# Patient Record
Sex: MALE | Race: BLACK OR AFRICAN AMERICAN | NOT HISPANIC OR LATINO | Employment: OTHER | URBAN - METROPOLITAN AREA
[De-identification: names, ages, dates, MRNs, and addresses within clinical notes are randomized per-mention and may not be internally consistent; named-entity substitution may affect disease eponyms.]

---

## 2017-07-06 PROBLEM — I10 HTN (HYPERTENSION): Status: ACTIVE | Noted: 2017-07-06

## 2017-07-06 PROBLEM — E78.2 MIXED DYSLIPIDEMIA: Status: ACTIVE | Noted: 2017-07-06

## 2017-07-06 PROBLEM — N39.0 FREQUENT UTI: Status: ACTIVE | Noted: 2017-07-06

## 2017-07-06 PROBLEM — F79 MENTAL RETARDATION: Status: ACTIVE | Noted: 2017-07-06

## 2017-07-06 PROBLEM — F06.30 MOOD DISORDER DUE TO MEDICAL CONDITION: Status: ACTIVE | Noted: 2017-07-06

## 2017-07-11 PROBLEM — R53.1 WEAKNESS: Status: ACTIVE | Noted: 2017-07-11

## 2017-07-11 PROBLEM — R41.82 ALTERED MENTAL STATUS: Status: ACTIVE | Noted: 2017-07-11

## 2017-10-05 PROBLEM — N40.0 BPH (BENIGN PROSTATIC HYPERPLASIA): Status: ACTIVE | Noted: 2017-10-05

## 2017-10-05 PROBLEM — E53.8 VITAMIN B 12 DEFICIENCY: Status: ACTIVE | Noted: 2017-10-05

## 2018-03-12 PROBLEM — Z12.11 SCREEN FOR COLON CANCER: Status: ACTIVE | Noted: 2018-03-12

## 2020-05-23 PROBLEM — R31.9 URINARY TRACT INFECTION WITH HEMATURIA: Status: ACTIVE | Noted: 2020-05-23

## 2020-05-23 PROBLEM — N39.0 URINARY TRACT INFECTION WITH HEMATURIA: Status: ACTIVE | Noted: 2020-05-23

## 2020-05-25 PROBLEM — N39.0 E-COLI UTI: Status: ACTIVE | Noted: 2020-05-25

## 2020-05-25 PROBLEM — G80.9 CEREBRAL PALSY: Status: ACTIVE | Noted: 2020-05-25

## 2020-05-25 PROBLEM — B96.20 E-COLI UTI: Status: ACTIVE | Noted: 2020-05-25

## 2022-01-12 DIAGNOSIS — U07.1 COVID-19 VIRUS DETECTED: ICD-10-CM

## 2022-02-28 ENCOUNTER — LAB VISIT (OUTPATIENT)
Dept: LAB | Facility: HOSPITAL | Age: 57
End: 2022-02-28
Payer: MEDICAID

## 2022-02-28 DIAGNOSIS — D64.9 ANEMIA, UNSPECIFIED TYPE: ICD-10-CM

## 2022-02-28 PROCEDURE — 82274 ASSAY TEST FOR BLOOD FECAL: CPT | Performed by: NURSE PRACTITIONER

## 2022-03-08 LAB — HEMOCCULT STL QL IA: NEGATIVE

## 2023-06-02 ENCOUNTER — LAB VISIT (OUTPATIENT)
Dept: LAB | Facility: HOSPITAL | Age: 58
End: 2023-06-02
Payer: MEDICAID

## 2023-06-02 DIAGNOSIS — Z12.11 SCREEN FOR COLON CANCER: ICD-10-CM

## 2023-06-02 PROCEDURE — 82274 ASSAY TEST FOR BLOOD FECAL: CPT | Performed by: NURSE PRACTITIONER

## 2023-07-06 LAB — HEMOCCULT STL QL IA: NEGATIVE

## 2024-03-03 PROBLEM — I82.402 ACUTE DEEP VEIN THROMBOSIS (DVT) OF LEFT LOWER EXTREMITY: Status: ACTIVE | Noted: 2024-03-03

## 2024-03-03 PROBLEM — Z12.11 SCREEN FOR COLON CANCER: Status: RESOLVED | Noted: 2018-03-12 | Resolved: 2024-03-03

## 2024-03-03 PROBLEM — B96.20 E-COLI UTI: Status: RESOLVED | Noted: 2020-05-25 | Resolved: 2024-03-03

## 2024-03-03 PROBLEM — R41.82 ALTERED MENTAL STATUS: Status: RESOLVED | Noted: 2017-07-11 | Resolved: 2024-03-03

## 2024-03-03 PROBLEM — N39.0 E-COLI UTI: Status: RESOLVED | Noted: 2020-05-25 | Resolved: 2024-03-03

## 2024-03-22 PROBLEM — K92.0 HEMATEMESIS: Status: ACTIVE | Noted: 2024-03-22

## 2024-03-22 PROBLEM — I82.411 ACUTE DEEP VEIN THROMBOSIS (DVT) OF FEMORAL VEIN OF RIGHT LOWER EXTREMITY: Status: ACTIVE | Noted: 2024-03-22

## 2024-03-24 ENCOUNTER — HOSPITAL ENCOUNTER (INPATIENT)
Facility: HOSPITAL | Age: 59
LOS: 5 days | Discharge: HOME | DRG: 389 | End: 2024-03-29
Attending: SURGERY | Admitting: SURGERY
Payer: MEDICAID

## 2024-03-24 DIAGNOSIS — K56.7 ILEUS: ICD-10-CM

## 2024-03-24 DIAGNOSIS — Z91.89 AT RISK FOR PROLONGED QT INTERVAL SYNDROME: ICD-10-CM

## 2024-03-24 DIAGNOSIS — F79 INTELLECTUAL DISABILITY: ICD-10-CM

## 2024-03-24 DIAGNOSIS — G80.9 CEREBRAL PALSY, UNSPECIFIED TYPE: Primary | ICD-10-CM

## 2024-03-24 LAB
ABO + RH BLD: NORMAL
ALBUMIN SERPL BCP-MCNC: 2.5 G/DL (ref 3.5–5.2)
ALP SERPL-CCNC: 155 U/L (ref 55–135)
ALT SERPL W/O P-5'-P-CCNC: 30 U/L (ref 10–44)
ANION GAP SERPL CALC-SCNC: 10 MMOL/L (ref 8–16)
APTT PPP: 26.8 SEC (ref 21–32)
AST SERPL-CCNC: 30 U/L (ref 10–40)
BASOPHILS # BLD AUTO: 0.02 K/UL (ref 0–0.2)
BASOPHILS NFR BLD: 0.1 % (ref 0–1.9)
BILIRUB SERPL-MCNC: 0.5 MG/DL (ref 0.1–1)
BLD GP AB SCN CELLS X3 SERPL QL: NORMAL
BUN SERPL-MCNC: 24 MG/DL (ref 8–23)
CALCIUM SERPL-MCNC: 8.3 MG/DL (ref 8.7–10.5)
CHLORIDE SERPL-SCNC: 109 MMOL/L (ref 95–110)
CO2 SERPL-SCNC: 23 MMOL/L (ref 23–29)
CREAT SERPL-MCNC: 0.8 MG/DL (ref 0.5–1.4)
DIFFERENTIAL METHOD BLD: ABNORMAL
EOSINOPHIL # BLD AUTO: 0 K/UL (ref 0–0.5)
EOSINOPHIL NFR BLD: 0 % (ref 0–8)
ERYTHROCYTE [DISTWIDTH] IN BLOOD BY AUTOMATED COUNT: 14.7 % (ref 11.5–14.5)
EST. GFR  (NO RACE VARIABLE): >60 ML/MIN/1.73 M^2
FIBRINOGEN PPP-MCNC: 411 MG/DL (ref 182–400)
GLUCOSE SERPL-MCNC: 115 MG/DL (ref 70–110)
HCT VFR BLD AUTO: 37.8 % (ref 40–54)
HGB BLD-MCNC: 12.1 G/DL (ref 14–18)
IMM GRANULOCYTES # BLD AUTO: 0.04 K/UL (ref 0–0.04)
IMM GRANULOCYTES NFR BLD AUTO: 0.3 % (ref 0–0.5)
INR PPP: 1.1 (ref 0.8–1.2)
LACTATE SERPL-SCNC: 2.6 MMOL/L (ref 0.5–2.2)
LYMPHOCYTES # BLD AUTO: 1.3 K/UL (ref 1–4.8)
LYMPHOCYTES NFR BLD: 8.5 % (ref 18–48)
MAGNESIUM SERPL-MCNC: 1.8 MG/DL (ref 1.6–2.6)
MCH RBC QN AUTO: 31 PG (ref 27–31)
MCHC RBC AUTO-ENTMCNC: 32 G/DL (ref 32–36)
MCV RBC AUTO: 97 FL (ref 82–98)
MONOCYTES # BLD AUTO: 0.8 K/UL (ref 0.3–1)
MONOCYTES NFR BLD: 4.9 % (ref 4–15)
NEUTROPHILS # BLD AUTO: 13.7 K/UL (ref 1.8–7.7)
NEUTROPHILS NFR BLD: 86.2 % (ref 38–73)
NRBC BLD-RTO: 0 /100 WBC
PHOSPHATE SERPL-MCNC: 3.8 MG/DL (ref 2.7–4.5)
PLATELET # BLD AUTO: 283 K/UL (ref 150–450)
PMV BLD AUTO: 10.7 FL (ref 9.2–12.9)
POCT GLUCOSE: 120 MG/DL (ref 70–110)
POTASSIUM SERPL-SCNC: 3.3 MMOL/L (ref 3.5–5.1)
POTASSIUM SERPL-SCNC: 3.8 MMOL/L (ref 3.5–5.1)
PROT SERPL-MCNC: 6.1 G/DL (ref 6–8.4)
PROTHROMBIN TIME: 11.9 SEC (ref 9–12.5)
RBC # BLD AUTO: 3.9 M/UL (ref 4.6–6.2)
SODIUM SERPL-SCNC: 142 MMOL/L (ref 136–145)
SPECIMEN OUTDATE: NORMAL
WBC # BLD AUTO: 15.85 K/UL (ref 3.9–12.7)

## 2024-03-24 PROCEDURE — 85384 FIBRINOGEN ACTIVITY: CPT | Performed by: SURGERY

## 2024-03-24 PROCEDURE — 36415 COLL VENOUS BLD VENIPUNCTURE: CPT | Performed by: SURGERY

## 2024-03-24 PROCEDURE — 83735 ASSAY OF MAGNESIUM: CPT | Performed by: SURGERY

## 2024-03-24 PROCEDURE — 84132 ASSAY OF SERUM POTASSIUM: CPT | Performed by: SURGERY

## 2024-03-24 PROCEDURE — 94761 N-INVAS EAR/PLS OXIMETRY MLT: CPT

## 2024-03-24 PROCEDURE — 86901 BLOOD TYPING SEROLOGIC RH(D): CPT | Performed by: SURGERY

## 2024-03-24 PROCEDURE — 11000001 HC ACUTE MED/SURG PRIVATE ROOM

## 2024-03-24 PROCEDURE — 80053 COMPREHEN METABOLIC PANEL: CPT | Performed by: SURGERY

## 2024-03-24 PROCEDURE — 63600175 PHARM REV CODE 636 W HCPCS

## 2024-03-24 PROCEDURE — 85610 PROTHROMBIN TIME: CPT | Performed by: SURGERY

## 2024-03-24 PROCEDURE — 99223 1ST HOSP IP/OBS HIGH 75: CPT | Mod: ,,, | Performed by: SURGERY

## 2024-03-24 PROCEDURE — C9113 INJ PANTOPRAZOLE SODIUM, VIA: HCPCS

## 2024-03-24 PROCEDURE — 25000003 PHARM REV CODE 250

## 2024-03-24 PROCEDURE — 84100 ASSAY OF PHOSPHORUS: CPT | Performed by: SURGERY

## 2024-03-24 PROCEDURE — 85730 THROMBOPLASTIN TIME PARTIAL: CPT | Performed by: SURGERY

## 2024-03-24 PROCEDURE — 83605 ASSAY OF LACTIC ACID: CPT | Performed by: SURGERY

## 2024-03-24 PROCEDURE — 85025 COMPLETE CBC W/AUTO DIFF WBC: CPT | Performed by: SURGERY

## 2024-03-24 RX ORDER — CALCIUM GLUCONATE 20 MG/ML
1 INJECTION, SOLUTION INTRAVENOUS
Status: DISCONTINUED | OUTPATIENT
Start: 2024-03-24 | End: 2024-03-25

## 2024-03-24 RX ORDER — POTASSIUM CHLORIDE 7.45 MG/ML
80 INJECTION INTRAVENOUS
Status: DISCONTINUED | OUTPATIENT
Start: 2024-03-24 | End: 2024-03-25

## 2024-03-24 RX ORDER — RISPERIDONE 0.5 MG/1
0.5 TABLET ORAL NIGHTLY
Status: DISCONTINUED | OUTPATIENT
Start: 2024-03-24 | End: 2024-03-29 | Stop reason: HOSPADM

## 2024-03-24 RX ORDER — LAMOTRIGINE 100 MG/1
100 TABLET ORAL 2 TIMES DAILY
Status: DISCONTINUED | OUTPATIENT
Start: 2024-03-24 | End: 2024-03-29 | Stop reason: HOSPADM

## 2024-03-24 RX ORDER — ONDANSETRON HYDROCHLORIDE 2 MG/ML
4 INJECTION, SOLUTION INTRAVENOUS EVERY 8 HOURS PRN
Status: DISCONTINUED | OUTPATIENT
Start: 2024-03-24 | End: 2024-03-29 | Stop reason: HOSPADM

## 2024-03-24 RX ORDER — SODIUM CHLORIDE, SODIUM LACTATE, POTASSIUM CHLORIDE, CALCIUM CHLORIDE 600; 310; 30; 20 MG/100ML; MG/100ML; MG/100ML; MG/100ML
INJECTION, SOLUTION INTRAVENOUS CONTINUOUS
Status: DISCONTINUED | OUTPATIENT
Start: 2024-03-24 | End: 2024-03-25

## 2024-03-24 RX ORDER — SODIUM CHLORIDE 0.9 % (FLUSH) 0.9 %
3 SYRINGE (ML) INJECTION
Status: DISCONTINUED | OUTPATIENT
Start: 2024-03-24 | End: 2024-03-29 | Stop reason: HOSPADM

## 2024-03-24 RX ORDER — ACETAMINOPHEN 325 MG/1
650 TABLET ORAL EVERY 4 HOURS PRN
Status: DISCONTINUED | OUTPATIENT
Start: 2024-03-24 | End: 2024-03-29 | Stop reason: HOSPADM

## 2024-03-24 RX ORDER — ENOXAPARIN SODIUM 100 MG/ML
40 INJECTION SUBCUTANEOUS EVERY 24 HOURS
Status: DISCONTINUED | OUTPATIENT
Start: 2024-03-24 | End: 2024-03-29

## 2024-03-24 RX ORDER — DIVALPROEX SODIUM 125 MG/1
125 CAPSULE, COATED PELLETS ORAL EVERY 8 HOURS
Status: DISCONTINUED | OUTPATIENT
Start: 2024-03-24 | End: 2024-03-25

## 2024-03-24 RX ORDER — CALCIUM GLUCONATE 20 MG/ML
3 INJECTION, SOLUTION INTRAVENOUS
Status: DISCONTINUED | OUTPATIENT
Start: 2024-03-24 | End: 2024-03-25

## 2024-03-24 RX ORDER — PROCHLORPERAZINE EDISYLATE 5 MG/ML
5 INJECTION INTRAMUSCULAR; INTRAVENOUS EVERY 6 HOURS PRN
Status: DISCONTINUED | OUTPATIENT
Start: 2024-03-24 | End: 2024-03-29 | Stop reason: HOSPADM

## 2024-03-24 RX ORDER — PANTOPRAZOLE SODIUM 40 MG/10ML
40 INJECTION, POWDER, LYOPHILIZED, FOR SOLUTION INTRAVENOUS 2 TIMES DAILY
Status: DISCONTINUED | OUTPATIENT
Start: 2024-03-24 | End: 2024-03-29 | Stop reason: HOSPADM

## 2024-03-24 RX ORDER — TRAMADOL HYDROCHLORIDE 50 MG/1
50 TABLET ORAL EVERY 6 HOURS PRN
Status: DISCONTINUED | OUTPATIENT
Start: 2024-03-24 | End: 2024-03-25

## 2024-03-24 RX ORDER — POTASSIUM CHLORIDE 7.45 MG/ML
40 INJECTION INTRAVENOUS
Status: DISCONTINUED | OUTPATIENT
Start: 2024-03-24 | End: 2024-03-25

## 2024-03-24 RX ORDER — MAGNESIUM SULFATE HEPTAHYDRATE 40 MG/ML
2 INJECTION, SOLUTION INTRAVENOUS
Status: DISCONTINUED | OUTPATIENT
Start: 2024-03-24 | End: 2024-03-25

## 2024-03-24 RX ORDER — MAGNESIUM SULFATE HEPTAHYDRATE 40 MG/ML
4 INJECTION, SOLUTION INTRAVENOUS
Status: DISCONTINUED | OUTPATIENT
Start: 2024-03-24 | End: 2024-03-25

## 2024-03-24 RX ORDER — CALCIUM GLUCONATE 20 MG/ML
2 INJECTION, SOLUTION INTRAVENOUS
Status: DISCONTINUED | OUTPATIENT
Start: 2024-03-24 | End: 2024-03-25

## 2024-03-24 RX ORDER — OLANZAPINE 10 MG/1
10 TABLET ORAL NIGHTLY
Status: DISCONTINUED | OUTPATIENT
Start: 2024-03-24 | End: 2024-03-29 | Stop reason: HOSPADM

## 2024-03-24 RX ORDER — POTASSIUM CHLORIDE 7.45 MG/ML
60 INJECTION INTRAVENOUS
Status: DISCONTINUED | OUTPATIENT
Start: 2024-03-24 | End: 2024-03-25

## 2024-03-24 RX ORDER — POLYETHYLENE GLYCOL 3350 17 G/17G
17 POWDER, FOR SOLUTION ORAL 2 TIMES DAILY
Status: DISCONTINUED | OUTPATIENT
Start: 2024-03-24 | End: 2024-03-24

## 2024-03-24 RX ADMIN — SODIUM CHLORIDE, POTASSIUM CHLORIDE, SODIUM LACTATE AND CALCIUM CHLORIDE: 600; 310; 30; 20 INJECTION, SOLUTION INTRAVENOUS at 05:03

## 2024-03-24 RX ADMIN — DIVALPROEX SODIUM 125 MG: 125 CAPSULE, COATED PELLETS ORAL at 05:03

## 2024-03-24 RX ADMIN — POTASSIUM CHLORIDE 10 MEQ: 7.46 INJECTION, SOLUTION INTRAVENOUS at 11:03

## 2024-03-24 RX ADMIN — OLANZAPINE 10 MG: 5 TABLET, FILM COATED ORAL at 08:03

## 2024-03-24 RX ADMIN — POTASSIUM CHLORIDE 10 MEQ: 7.46 INJECTION, SOLUTION INTRAVENOUS at 07:03

## 2024-03-24 RX ADMIN — TRAMADOL HYDROCHLORIDE 50 MG: 50 TABLET, COATED ORAL at 04:03

## 2024-03-24 RX ADMIN — LAMOTRIGINE 100 MG: 100 TABLET ORAL at 08:03

## 2024-03-24 RX ADMIN — LAMOTRIGINE 100 MG: 100 TABLET ORAL at 09:03

## 2024-03-24 RX ADMIN — PANTOPRAZOLE SODIUM 40 MG: 40 INJECTION, POWDER, FOR SOLUTION INTRAVENOUS at 08:03

## 2024-03-24 RX ADMIN — POTASSIUM CHLORIDE 10 MEQ: 7.46 INJECTION, SOLUTION INTRAVENOUS at 02:03

## 2024-03-24 RX ADMIN — POTASSIUM CHLORIDE 10 MEQ: 7.46 INJECTION, SOLUTION INTRAVENOUS at 10:03

## 2024-03-24 RX ADMIN — MAGNESIUM SULFATE HEPTAHYDRATE 2 G: 40 INJECTION, SOLUTION INTRAVENOUS at 07:03

## 2024-03-24 RX ADMIN — DIVALPROEX SODIUM 125 MG: 125 CAPSULE, COATED PELLETS ORAL at 09:03

## 2024-03-24 RX ADMIN — SODIUM CHLORIDE, POTASSIUM CHLORIDE, SODIUM LACTATE AND CALCIUM CHLORIDE 1000 ML: 600; 310; 30; 20 INJECTION, SOLUTION INTRAVENOUS at 04:03

## 2024-03-24 RX ADMIN — SODIUM CHLORIDE, POTASSIUM CHLORIDE, SODIUM LACTATE AND CALCIUM CHLORIDE: 600; 310; 30; 20 INJECTION, SOLUTION INTRAVENOUS at 10:03

## 2024-03-24 RX ADMIN — DIVALPROEX SODIUM 125 MG: 125 CAPSULE, COATED PELLETS ORAL at 02:03

## 2024-03-24 RX ADMIN — Medication 1 ENEMA: at 11:03

## 2024-03-24 RX ADMIN — OLANZAPINE 10 MG: 5 TABLET, FILM COATED ORAL at 04:03

## 2024-03-24 RX ADMIN — PANTOPRAZOLE SODIUM 40 MG: 40 INJECTION, POWDER, FOR SOLUTION INTRAVENOUS at 09:03

## 2024-03-24 RX ADMIN — ENOXAPARIN SODIUM 40 MG: 40 INJECTION SUBCUTANEOUS at 05:03

## 2024-03-24 RX ADMIN — PIPERACILLIN SODIUM AND TAZOBACTAM SODIUM 4.5 G: 4; .5 INJECTION, POWDER, FOR SOLUTION INTRAVENOUS at 09:03

## 2024-03-24 RX ADMIN — POTASSIUM CHLORIDE 10 MEQ: 7.46 INJECTION, SOLUTION INTRAVENOUS at 12:03

## 2024-03-24 RX ADMIN — RISPERIDONE 0.5 MG: 0.25 TABLET, FILM COATED ORAL at 08:03

## 2024-03-24 RX ADMIN — POTASSIUM CHLORIDE 10 MEQ: 7.46 INJECTION, SOLUTION INTRAVENOUS at 08:03

## 2024-03-24 NOTE — H&P
[facility]  Critical Care - Surgery  History & Physical    Patient Name: Edinson Umanzor  MRN: [id number]  Admission Date: 3/24/2024  Code Status: Full Code  Attending Physician: Henrry Saldivar MD   Primary Care Provider: Krishan Massey II, NP   Principal Problem: <principal problem not specified>    Subjective:     HPI:  59M with PMHx of anxiety/depression, bipolar 1, schizophrenia, cerebral palsy, profound mental delay, BPH, HTN, & HLD with recently diagnosed DVT (LLE, 3/24) on Xarelto presented to [place] ED 3/22 c/o spotty bloody sputum as well as reported blood in stool. H&H stable. He was admitted to the hospital medicine service at OSH for further management.    On the morning of 3/23, he was markedly more distended on exam. Notably lack of clear communication with patient makes history and diagnosis difficult. A CT A/P was obtained that demonstrated severe distention of stomach, small bowel, colon, and distal esophagus suggesting profound ileus with possible pneumatosis of stomach and small bowel with concern for early ischemic process.  IV abx and fluid resuscitation were started, and an NGT placed for decompression return of dark foul-smelling drainage.  The patient became increasingly more tachycardic w/ uptrending lactate 1.5->2.2->3.0. He is transferred to [facility] for evaluation by General Surgery and higher level of care.    On admission to the SICU, the patient is awake and alert at his baseline level of function and communication and in stable condition. He is not requiring vasopressors; blood pressure goals are a MAP >65 and SBP < 180. He does not have any central or arterial access. An NGT is in place to LIWS.    Immediate plans include close hemodynamic and physical exam monitoring and continued fluid resuscitation as needed.            Hospital/ICU Course:  No notes on file         Past Medical History:   Diagnosis Date    Anxiety     Bipolar 1 disorder     BPH  (benign prostatic hyperplasia)     Cerebral palsy     Depression     Frequent UTI     HTN (hypertension)     Hypercholesteremia     Mental retardation     Mood disorder due to medical condition     Schizophrenia        Past Surgical History:   Procedure Laterality Date    BOWEL RESECTION      COLONOSCOPY N/A 3/12/2018    Procedure: COLONOSCOPY;  Surgeon: Chip Villanueva MD;  Location: [facility];  Service: Endoscopy;  Laterality: N/A;       Review of patient's allergies indicates:  No Known Allergies    Family History       Problem Relation (Age of Onset)    Breast cancer Sister    Heart disease Sister, Sister    No Known Problems Father, Mother          Tobacco Use    Smoking status: Never    Smokeless tobacco: Never   Substance and Sexual Activity    Alcohol use: No    Drug use: No    Sexual activity: Never      Review of Systems   Reason unable to perform ROS: intellectual delay.     Objective:     Vital Signs (Most Recent):  Temp: 98.4 °F (36.9 °C) (03/24/24 0400)  Pulse: (!) 123 (03/24/24 0400)  Resp: (!) 24 (03/24/24 0400)  BP: (!) 123/90 (03/24/24 0400)  SpO2: 95 % (03/24/24 0400) Vital Signs (24h Range):  Temp:  [96.1 °F (35.6 °C)-98.4 °F (36.9 °C)] 98.4 °F (36.9 °C)  Pulse:  [110-131] 123  Resp:  [17-24] 24  SpO2:  [94 %-98 %] 95 %  BP: (123-141)/(75-92) 123/90     Weight: 63.5 kg (139 lb 15.9 oz)  Body mass index is 22.6 kg/m².    No intake or output data in the 24 hours ending 03/24/24 0448       Physical Exam  Constitutional:       Appearance: Normal appearance.      Comments: Flat affect   Cardiovascular:      Rate and Rhythm: Regular rhythm. Tachycardia present.   Pulmonary:      Effort: Pulmonary effort is normal. No respiratory distress.   Abdominal:      General: There is distension.      Tenderness: There is no abdominal tenderness.      Comments: NGT to LIWS with drain output  Abdomen is distended but soft in all 4 quadrants. No pain verbalized by patient or grimacing to demonstrate  discomfort on palpation. No rebound or rigidity.  Well-healed laparotomy, L flank incisional scars   Genitourinary:     Comments: Adult diaper  Musculoskeletal:      Comments: Contracted upper extremities   Skin:     General: Skin is warm and dry.   Neurological:      Mental Status: He is alert and oriented to person, place, and time. Mental status is at baseline.            Vents:       Lines/Drains/Airways       Drain  Duration                  NG/OG Tube 03/23/24 1710 Right nostril <1 day              Peripheral Intravenous Line  Duration                  Peripheral IV - Single Lumen 03/22/24 1157 22 G Left;Posterior Hand 1 day         Peripheral IV - Single Lumen 03/23/24 2204 20 G Anterior;Left;Proximal Forearm <1 day                    Significant Labs:    CBC/Anemia Profile:  Recent Labs   Lab 03/22/24  1950 03/23/24  0456 03/24/24  0417   WBC 8.50 7.10 15.85*   HGB 11.7* 12.6* 12.1*   HCT 36.3* 39.8* 37.8*    255 283   MCV 96 97 97   RDW 15.4* 15.5* 14.7*        Chemistries:  Recent Labs   Lab 03/22/24  1203 03/23/24  0456    143   K 4.1 4.0    106   CO2 29 28   BUN 24* 25*   CREATININE 0.87 0.83   CALCIUM 9.2 9.6   ALBUMIN 3.6 4.0   PROT 7.4 7.8   BILITOT 0.5 0.7   ALKPHOS 162* 187*   ALT 38 36   AST 43 51       All pertinent labs within the past 24 hours have been reviewed.    Significant Imaging: I have reviewed all pertinent imaging results/findings within the past 24 hours.  Assessment/Plan:     GI  Hematemesis  59M with a PMH of anxiety/depression, bipolar 1, schizophrenia, cerebral palsy, profound mental delay, BPH, HTN, & HLD with recently diagnosed DVT on xarelto presented to OSH with concerns for GIB, abdominal exam prompted CT A/P with findings concerning for early ischemic process. He is transferred to [facility] for higher level of care. However on arrival, his abdominal exam is reassuring and CT A/P may represent Angella's syndrome. We will closely monitor his labs and exam and  continue treatment for GIB at this time.    Neuro/Psych:   - Sedation: none  - Pain: tylenol 650 PRN, tramadol 50 PRN   - Home medications resumed as able via NGT:   divalproex 125 q8h, lamotrigine 100 BID, risperdal 0.5 qhs, olanzapine 10 qhs             Cardiac:   - BP Goal: MAP > 65  - Pressors: none  - Rhythm: NSR, tachycardia  - Resuscitation w/ 1L LR bolus + continuous mIVF, unclear how much received at OSH    Pulmonary:   - On RA  - Goal SpO2 >92%     Renal:  Recent Labs   Lab 03/22/24  1203 03/23/24  0456 03/24/24  0417   BUN 24* 25* 24*   CREATININE 0.87 0.83 0.8     - Trend BUN/Cr     FEN / GI:   - Daily CMP, PRN K/Mag/Phos per protocol   - Replace electrolytes as needed  - Nutrition: NPO   - NGT to LIWS  - GI ppx: BID PPI IV  - Consider GI consult for EGD if concern for UGI bleed remains.  - Bowel Regimen: held     ID:   - Afebrile  Recent Labs   Lab 03/22/24  1950 03/23/24  0456 03/24/24  0417   WBC 8.50 7.10 15.85*     - Abx: Zosyn initiated for c/f bowel ischemia, will continue for now    Heme/Onc:   Recent Labs   Lab 03/22/24  1950 03/23/24  0456 03/24/24  0417   HGB 11.7* 12.6* 12.1*    255 283   APTT  --   --  26.8   INR  --   --  1.1      - Pt on home xarelto for recently dx DVT in the LLE.    - Therapeutic anticoagulation held for now in setting of presumed GIB  - CBC, PTT daily   - DVT ppx: lovenox 40 qd      Endocrine:   - CCM Goal BG <180  - POCT glucose frequency per protocol     PPx:   Feeding: NPO  Analgesia/Sedation: PRNs/none  Thromboembolic Prevention: lovenox ppx dosing, known LE DVT  HOB >30: Yes  Stress Ulcer: BID PPI  Glucose Control: Will monitor as necessary     Lines/Drains/Airway:  NGT     Dispo/Code Status/Palliative:   - SICU  - Full Code         Linda Hammer MD  Critical Care - Surgery  [facility]

## 2024-03-24 NOTE — SUBJECTIVE & OBJECTIVE
Interval History: NAEON. Patient remains non tender on exam. Afebrile, low grade tachycardia. Lactate down trending with fluid resuscitation.     Medications:  Continuous Infusions:   lactated ringers 100 mL/hr at 03/24/24 0800     Scheduled Meds:   divalproex  125 mg Oral Q8H    enoxparin  40 mg Subcutaneous Daily    lamoTRIgine  100 mg Per NG tube BID    OLANZapine  10 mg Per NG tube Nightly    pantoprazole  40 mg Intravenous BID    piperacillin-tazobactam (Zosyn) IV (PEDS and ADULTS) (extended infusion is not appropriate)  4.5 g Intravenous Q8H    risperiDONE  0.5 mg Per NG tube QHS     PRN Meds:acetaminophen, calcium gluconate IVPB, calcium gluconate IVPB, calcium gluconate IVPB, magnesium sulfate IVPB, magnesium sulfate IVPB, ondansetron, potassium chloride **AND** potassium chloride **AND** potassium chloride, prochlorperazine, sodium chloride 0.9%, sodium phosphate 15 mmol in dextrose 5 % (D5W) 250 mL IVPB, sodium phosphate 20.01 mmol in dextrose 5 % (D5W) 250 mL IVPB, sodium phosphate 30 mmol in dextrose 5 % (D5W) 250 mL IVPB, traMADoL     Review of patient's allergies indicates:  No Known Allergies  Objective:     Vital Signs (Most Recent):  Temp: 98.3 °F (36.8 °C) (03/24/24 0700)  Pulse: (!) 120 (03/24/24 0800)  Resp: (!) 21 (03/24/24 0800)  BP: (!) 165/79 (03/24/24 0800)  SpO2: 95 % (03/24/24 0800) Vital Signs (24h Range):  Temp:  [97 °F (36.1 °C)-98.4 °F (36.9 °C)] 98.3 °F (36.8 °C)  Pulse:  [110-131] 120  Resp:  [12-25] 21  SpO2:  [94 %-100 %] 95 %  BP: (123-193)/(70-98) 165/79     Weight: 63.5 kg (139 lb 15.9 oz)  Body mass index is 22.6 kg/m².    Intake/Output - Last 3 Shifts         03/22 0700 03/23 0659 03/23 0700 03/24 0659 03/24 0700 03/25 0659    I.V. (mL/kg)   246.5 (3.9)    IV Piggyback   1021    Total Intake(mL/kg)   1267.5 (20)    Urine (mL/kg/hr)   200 (2.3)    Other   0    Total Output   200    Net   +1067.5           Urine Occurrence   1 x             Physical Exam  Vitals reviewed.    Constitutional:       Appearance: Normal appearance.   HENT:      Nose:      Comments: NGT in place  Cardiovascular:      Rate and Rhythm: Normal rate and regular rhythm.   Pulmonary:      Effort: Pulmonary effort is normal. No respiratory distress.   Abdominal:      General: There is distension.      Palpations: Abdomen is soft.      Tenderness: There is no abdominal tenderness.   Skin:     General: Skin is warm.   Neurological:      General: No focal deficit present.      Mental Status: He is alert and oriented to person, place, and time.          Significant Labs:  I have reviewed all pertinent lab results within the past 24 hours.  CBC:   Recent Labs   Lab 03/24/24 0417   WBC 15.85*   RBC 3.90*   HGB 12.1*   HCT 37.8*      MCV 97   MCH 31.0   MCHC 32.0     CMP:   Recent Labs   Lab 03/24/24 0417   *   CALCIUM 8.3*   ALBUMIN 2.5*   PROT 6.1      K 3.3*   CO2 23      BUN 24*   CREATININE 0.8   ALKPHOS 155*   ALT 30   AST 30   BILITOT 0.5       Significant Diagnostics:  I have reviewed all pertinent imaging results/findings within the past 24 hours.

## 2024-03-24 NOTE — HPI
59M with PMHx of anxiety/depression, bipolar 1, schizophrenia, cerebral palsy, profound mental delay, BPH, HTN, & HLD with recently diagnosed DVT (LLE, 3/24) on Xarelto presented to [place] ED 3/22 c/o spotty bloody sputum as well as reported blood in stool. H&H stable. He was admitted to the hospital medicine service at OSH for further management.    On the morning of 3/23, he was markedly more distended on exam. Notably lack of clear communication with patient makes history and diagnosis difficult. A CT A/P was obtained that demonstrated severe distention of stomach, small bowel, colon, and distal esophagus suggesting profound ileus with possible pneumatosis of stomach and small bowel with concern for early ischemic process.  IV abx and fluid resuscitation were started, and an NGT placed for decompression return of dark foul-smelling drainage.  The patient became increasingly more tachycardic w/ uptrending lactate 1.5->2.2->3.0. He is transferred to [facility] for evaluation by General Surgery and higher level of care.    On admission to the SICU, the patient is awake and alert at his baseline level of function and communication and in stable condition. He is not requiring vasopressors; blood pressure goals are a MAP >65 and SBP < 180. He does not have any central or arterial access. An NGT is in place to LIWS.    Immediate plans include close hemodynamic and physical exam monitoring and continued fluid resuscitation as needed.

## 2024-03-24 NOTE — PROGRESS NOTES
[facility] Intensive [facility]  General Surgery  Progress Note    Subjective:     History of Present Illness:  No notes on file    Post-Op Info:  * No surgery found *         Interval History: NAEON. Patient remains non tender on exam. Afebrile, low grade tachycardia. Lactate down trending with fluid resuscitation.     Medications:  Continuous Infusions:   lactated ringers 100 mL/hr at 03/24/24 0800     Scheduled Meds:   divalproex  125 mg Oral Q8H    enoxparin  40 mg Subcutaneous Daily    lamoTRIgine  100 mg Per NG tube BID    OLANZapine  10 mg Per NG tube Nightly    pantoprazole  40 mg Intravenous BID    piperacillin-tazobactam (Zosyn) IV (PEDS and ADULTS) (extended infusion is not appropriate)  4.5 g Intravenous Q8H    risperiDONE  0.5 mg Per NG tube QHS     PRN Meds:acetaminophen, calcium gluconate IVPB, calcium gluconate IVPB, calcium gluconate IVPB, magnesium sulfate IVPB, magnesium sulfate IVPB, ondansetron, potassium chloride **AND** potassium chloride **AND** potassium chloride, prochlorperazine, sodium chloride 0.9%, sodium phosphate 15 mmol in dextrose 5 % (D5W) 250 mL IVPB, sodium phosphate 20.01 mmol in dextrose 5 % (D5W) 250 mL IVPB, sodium phosphate 30 mmol in dextrose 5 % (D5W) 250 mL IVPB, traMADoL     Review of patient's allergies indicates:  No Known Allergies  Objective:     Vital Signs (Most Recent):  Temp: 98.3 °F (36.8 °C) (03/24/24 0700)  Pulse: (!) 120 (03/24/24 0800)  Resp: (!) 21 (03/24/24 0800)  BP: (!) 165/79 (03/24/24 0800)  SpO2: 95 % (03/24/24 0800) Vital Signs (24h Range):  Temp:  [97 °F (36.1 °C)-98.4 °F (36.9 °C)] 98.3 °F (36.8 °C)  Pulse:  [110-131] 120  Resp:  [12-25] 21  SpO2:  [94 %-100 %] 95 %  BP: (123-193)/(70-98) 165/79     Weight: 63.5 kg (139 lb 15.9 oz)  Body mass index is 22.6 kg/m².    Intake/Output - Last 3 Shifts         03/22 0700 03/23 0659 03/23 0700 03/24 0659 03/24 0700 03/25 0659    I.V. (mL/kg)   246.5 (3.9)    IV Piggyback   1021    Total Intake(mL/kg)    1267.5 (20)    Urine (mL/kg/hr)   200 (2.3)    Other   0    Total Output   200    Net   +1067.5           Urine Occurrence   1 x             Physical Exam  Vitals reviewed.   Constitutional:       Appearance: Normal appearance.   HENT:      Nose:      Comments: NGT in place  Cardiovascular:      Rate and Rhythm: Normal rate and regular rhythm.   Pulmonary:      Effort: Pulmonary effort is normal. No respiratory distress.   Abdominal:      General: There is distension.      Palpations: Abdomen is soft.      Tenderness: There is no abdominal tenderness.   Skin:     General: Skin is warm.   Neurological:      General: No focal deficit present.      Mental Status: He is alert and oriented to person, place, and time.          Significant Labs:  I have reviewed all pertinent lab results within the past 24 hours.  CBC:   Recent Labs   Lab 03/24/24 0417   WBC 15.85*   RBC 3.90*   HGB 12.1*   HCT 37.8*      MCV 97   MCH 31.0   MCHC 32.0     CMP:   Recent Labs   Lab 03/24/24 0417   *   CALCIUM 8.3*   ALBUMIN 2.5*   PROT 6.1      K 3.3*   CO2 23      BUN 24*   CREATININE 0.8   ALKPHOS 155*   ALT 30   AST 30   BILITOT 0.5       Significant Diagnostics:  I have reviewed all pertinent imaging results/findings within the past 24 hours.  Assessment/Plan:     Ileus  59 yoM with PMH including cerebral palsy transferred for concern of ischemic bowel and GI bleed. Patient likely has chronic ileus given neurologic and psych history. Lactate down trending with rehydration. Passing flatus.    - Recommend continued NGT decompression this morning, potential removal later today if low output and having bowel function  - Recommend continued crystalloid resuscitation  - Hold xarelto given [facility] concern for GI bleed, restart if no further evidence here  - PPI  - Continue zosyn given leukocytosis  - Trend lab work, including lactate until normal  - Likely ok for stepdown from the unit        Remy Alejandre  MD  General Surgery  [facility]

## 2024-03-24 NOTE — ASSESSMENT & PLAN NOTE
59 yoM with PMH including cerebral palsy transferred for concern of ischemic bowel and GI bleed. Patient likely has chronic ileus given neurologic and psych history. Lactate down trending with rehydration. Passing flatus.    - Recommend continued NGT decompression this morning, potential removal later today if low output and having bowel function  - Recommend continued crystalloid resuscitation  - Hold xarelto given [facility] concern for GI bleed, restart if no further evidence here  - PPI  - Continue zosyn given leukocytosis  - Trend lab work, including lactate until normal  - Likely ok for stepdown from the unit

## 2024-03-24 NOTE — NURSING
Pt report given to receiving RN; Pt plan of care discussed; Pt VSS; Pt has no c/o pain or discomfort at this time; Pt bed locked + lowered, Srx3, + call bell within reach.

## 2024-03-24 NOTE — NURSING
SICU PLAN OF CARE NOTE    Dx: <principal problem not specified>    Shift Events: Admit to SICU    Gtts: /hr    Neuro: Arouses to Voice, Follows Commands, Moves All Extremities, and Confused    Cardiac: sius tachycardia, hypertension    Respiratory: Room Air    GI: NPO    : Urinary Catheter 0 cc/shift    Drains: NGT to LIS, no output this shift, placement varified via aspiration, cleared for use for meds              Labs/Accuchecks: admit labs    Skin: R knee abrasion; R arm abrasion; R shoulder abrasion

## 2024-03-24 NOTE — ASSESSMENT & PLAN NOTE
59M with a PMH of anxiety/depression, bipolar 1, schizophrenia, cerebral palsy, profound mental delay, BPH, HTN, & HLD with recently diagnosed DVT on xarelto presented to OSH with concerns for GIB, abdominal exam prompted CT A/P with findings concerning for early ischemic process. He is [place] [facility] [place] of care. However on arrival, his abdominal exam is reassuring and CT A/P may represent Angella's syndrome. We will closely monitor his labs and exam and continue treatment for GIB at this time.    Neuro/Psych:   - Sedation: none  - Pain: tylenol 650 PRN, tramadol 50 PRN   - Home medications resumed as able via NGT:   divalproex 125 q8h, lamotrigine 100 BID, risperdal 0.5 qhs, olanzapine 10 qhs             Cardiac:   - BP Goal: MAP > 65  - Pressors: none  - Rhythm: NSR, tachycardia  - Resuscitation w/ 1L LR bolus + continuous mIVF, unclear how much received at OSH    Pulmonary:   - On RA  - Goal SpO2 >92%     Renal:  Recent Labs   Lab 03/22/24  1203 03/23/24  0456 03/24/24  0417   BUN 24* 25* 24*   CREATININE 0.87 0.83 0.8     - Trend BUN/Cr     FEN / GI:   - Daily CMP, PRN K/Mag/Phos per protocol   - Replace electrolytes as needed  - Nutrition: NPO   - NGT to LIWS  - GI ppx: BID PPI IV  - Consider GI consult for EGD if concern for UGI bleed remains.  - Bowel Regimen: held     ID:   - Afebrile  Recent Labs   Lab 03/22/24  1950 03/23/24  0456 03/24/24  0417   WBC 8.50 7.10 15.85*     - Abx: Zosyn initiated for c/f bowel ischemia, will continue for now    Heme/Onc:   Recent Labs   Lab 03/22/24  1950 03/23/24  0456 03/24/24  0417   HGB 11.7* 12.6* 12.1*    255 283   APTT  --   --  26.8   INR  --   --  1.1      - Pt on home xarelto for recently dx DVT in the LLE.    - Therapeutic anticoagulation held for now in setting of presumed GIB  - CBC, PTT daily   - DVT ppx: lovenox 40 qd      Endocrine:   - CCM Goal BG <180  - POCT glucose frequency per protocol     PPx:   Feeding:  NPO  Analgesia/Sedation: PRNs/none  Thromboembolic Prevention: lovenox ppx dosing, known LE DVT  HOB >30: Yes  Stress Ulcer: BID PPI  Glucose Control: Will monitor as necessary     Lines/Drains/Airway:  NGT     Dispo/Code Status/Palliative:   - SICU  - Full Code

## 2024-03-24 NOTE — PLAN OF CARE
Problem: Adult Inpatient Plan of Care  Goal: Plan of Care Review  Outcome: Ongoing, Progressing  Goal: Patient-Specific Goal (Individualized)  Outcome: Ongoing, Progressing  Goal: Absence of Hospital-Acquired Illness or Injury  Outcome: Ongoing, Progressing  Goal: Optimal Comfort and Wellbeing  Outcome: Ongoing, Progressing  Goal: Readiness for Transition of Care  Outcome: Ongoing, Progressing     Problem: Fall Injury Risk  Goal: Absence of Fall and Fall-Related Injury  Outcome: Ongoing, Progressing     Problem: Cognitive Impairment  Goal: Optimal Cognitive Function  Outcome: Ongoing, Progressing     Problem: Fluid Deficit (Intestinal Obstruction)  Goal: Fluid Balance  Outcome: Ongoing, Progressing     Problem: Infection (Intestinal Obstruction)  Goal: Absence of Infection Signs and Symptoms  Outcome: Ongoing, Progressing

## 2024-03-24 NOTE — SUBJECTIVE & OBJECTIVE
Past Medical History:   Diagnosis Date    Anxiety     Bipolar 1 disorder     BPH (benign prostatic hyperplasia)     Cerebral palsy     Depression     Frequent UTI     HTN (hypertension)     Hypercholesteremia     Mental retardation     Mood disorder due to medical condition     Schizophrenia        Past Surgical History:   Procedure Laterality Date    BOWEL RESECTION      COLONOSCOPY N/A 3/12/2018    Procedure: COLONOSCOPY;  Surgeon: Chip Villanueva MD;  Location: [facility];  Service: Endoscopy;  Laterality: N/A;       Review of patient's allergies indicates:  No Known Allergies    Family History       Problem Relation (Age of Onset)    Breast cancer Sister    Heart disease Sister, Sister    No Known Problems Father, Mother          Tobacco Use    Smoking status: Never    Smokeless tobacco: Never   Substance and Sexual Activity    Alcohol use: No    Drug use: No    Sexual activity: Never      Review of Systems   Reason unable to perform ROS: intellectual delay.     Objective:     Vital Signs (Most Recent):  Temp: 98.4 °F (36.9 °C) (03/24/24 0400)  Pulse: (!) 123 (03/24/24 0400)  Resp: (!) 24 (03/24/24 0400)  BP: (!) 123/90 (03/24/24 0400)  SpO2: 95 % (03/24/24 0400) Vital Signs (24h Range):  Temp:  [96.1 °F (35.6 °C)-98.4 °F (36.9 °C)] 98.4 °F (36.9 °C)  Pulse:  [110-131] 123  Resp:  [17-24] 24  SpO2:  [94 %-98 %] 95 %  BP: (123-141)/(75-92) 123/90     Weight: 63.5 kg (139 lb 15.9 oz)  Body mass index is 22.6 kg/m².    No intake or output data in the 24 hours ending 03/24/24 0448       Physical Exam  Constitutional:       Appearance: Normal appearance.      Comments: Flat affect   Cardiovascular:      Rate and Rhythm: Regular rhythm. Tachycardia present.   Pulmonary:      Effort: Pulmonary effort is normal. No respiratory distress.   Abdominal:      General: There is distension.      Tenderness: There is no abdominal tenderness.      Comments: NGT to LIWS with drain output  Abdomen is distended but soft  in all 4 quadrants. No pain verbalized by patient or grimacing to demonstrate discomfort on palpation. No rebound or rigidity.  Well-healed laparotomy, L flank incisional scars   Genitourinary:     Comments: Adult diaper  Musculoskeletal:      Comments: Contracted upper extremities   Skin:     General: Skin is warm and dry.   Neurological:      Mental Status: He is alert and oriented to person, place, and time. Mental status is at baseline.            Vents:       Lines/Drains/Airways       Drain  Duration                  NG/OG Tube 03/23/24 1710 Right nostril <1 day              Peripheral Intravenous Line  Duration                  Peripheral IV - Single Lumen 03/22/24 1157 22 G Left;Posterior Hand 1 day         Peripheral IV - Single Lumen 03/23/24 2204 20 G Anterior;Left;Proximal Forearm <1 day                    Significant Labs:    CBC/Anemia Profile:  Recent Labs   Lab 03/22/24  1950 03/23/24  0456 03/24/24  0417   WBC 8.50 7.10 15.85*   HGB 11.7* 12.6* 12.1*   HCT 36.3* 39.8* 37.8*    255 283   MCV 96 97 97   RDW 15.4* 15.5* 14.7*        Chemistries:  Recent Labs   Lab 03/22/24  1203 03/23/24  0456    143   K 4.1 4.0    106   CO2 29 28   BUN 24* 25*   CREATININE 0.87 0.83   CALCIUM 9.2 9.6   ALBUMIN 3.6 4.0   PROT 7.4 7.8   BILITOT 0.5 0.7   ALKPHOS 162* 187*   ALT 38 36   AST 43 51       All pertinent labs within the past 24 hours have been reviewed.    Significant Imaging: I have reviewed all pertinent imaging results/findings within the past 24 hours.

## 2024-03-25 LAB
ALBUMIN SERPL BCP-MCNC: 2.1 G/DL (ref 3.5–5.2)
ALBUMIN SERPL BCP-MCNC: 2.2 G/DL (ref 3.5–5.2)
ALP SERPL-CCNC: 126 U/L (ref 55–135)
ALP SERPL-CCNC: 130 U/L (ref 55–135)
ALT SERPL W/O P-5'-P-CCNC: 24 U/L (ref 10–44)
ALT SERPL W/O P-5'-P-CCNC: 25 U/L (ref 10–44)
ANION GAP SERPL CALC-SCNC: 7 MMOL/L (ref 8–16)
ANION GAP SERPL CALC-SCNC: 8 MMOL/L (ref 8–16)
AST SERPL-CCNC: 26 U/L (ref 10–40)
AST SERPL-CCNC: 28 U/L (ref 10–40)
BASOPHILS # BLD AUTO: 0.01 K/UL (ref 0–0.2)
BASOPHILS NFR BLD: 0.1 % (ref 0–1.9)
BILIRUB SERPL-MCNC: 0.4 MG/DL (ref 0.1–1)
BILIRUB SERPL-MCNC: 0.4 MG/DL (ref 0.1–1)
BUN SERPL-MCNC: 10 MG/DL (ref 8–23)
BUN SERPL-MCNC: 16 MG/DL (ref 8–23)
CALCIUM SERPL-MCNC: 8.3 MG/DL (ref 8.7–10.5)
CALCIUM SERPL-MCNC: 8.6 MG/DL (ref 8.7–10.5)
CHLORIDE SERPL-SCNC: 105 MMOL/L (ref 95–110)
CHLORIDE SERPL-SCNC: 109 MMOL/L (ref 95–110)
CO2 SERPL-SCNC: 26 MMOL/L (ref 23–29)
CO2 SERPL-SCNC: 26 MMOL/L (ref 23–29)
CREAT SERPL-MCNC: 0.6 MG/DL (ref 0.5–1.4)
CREAT SERPL-MCNC: 0.7 MG/DL (ref 0.5–1.4)
DIFFERENTIAL METHOD BLD: ABNORMAL
EOSINOPHIL # BLD AUTO: 0.1 K/UL (ref 0–0.5)
EOSINOPHIL NFR BLD: 0.9 % (ref 0–8)
ERYTHROCYTE [DISTWIDTH] IN BLOOD BY AUTOMATED COUNT: 15 % (ref 11.5–14.5)
EST. GFR  (NO RACE VARIABLE): >60 ML/MIN/1.73 M^2
EST. GFR  (NO RACE VARIABLE): >60 ML/MIN/1.73 M^2
GLUCOSE SERPL-MCNC: 64 MG/DL (ref 70–110)
GLUCOSE SERPL-MCNC: 67 MG/DL (ref 70–110)
HCT VFR BLD AUTO: 29.6 % (ref 40–54)
HGB BLD-MCNC: 9.6 G/DL (ref 14–18)
IMM GRANULOCYTES # BLD AUTO: 0.08 K/UL (ref 0–0.04)
IMM GRANULOCYTES NFR BLD AUTO: 0.7 % (ref 0–0.5)
LYMPHOCYTES # BLD AUTO: 1.4 K/UL (ref 1–4.8)
LYMPHOCYTES NFR BLD: 12.8 % (ref 18–48)
MAGNESIUM SERPL-MCNC: 1.9 MG/DL (ref 1.6–2.6)
MAGNESIUM SERPL-MCNC: 2.3 MG/DL (ref 1.6–2.6)
MCH RBC QN AUTO: 31.4 PG (ref 27–31)
MCHC RBC AUTO-ENTMCNC: 32.4 G/DL (ref 32–36)
MCV RBC AUTO: 97 FL (ref 82–98)
MONOCYTES # BLD AUTO: 0.8 K/UL (ref 0.3–1)
MONOCYTES NFR BLD: 6.8 % (ref 4–15)
NEUTROPHILS # BLD AUTO: 8.8 K/UL (ref 1.8–7.7)
NEUTROPHILS NFR BLD: 78.7 % (ref 38–73)
NRBC BLD-RTO: 0 /100 WBC
OHS QRS DURATION: 90 MS
OHS QTC CALCULATION: 414 MS
PHOSPHATE SERPL-MCNC: 2.8 MG/DL (ref 2.7–4.5)
PHOSPHATE SERPL-MCNC: 3 MG/DL (ref 2.7–4.5)
PLATELET # BLD AUTO: 214 K/UL (ref 150–450)
PMV BLD AUTO: 10.5 FL (ref 9.2–12.9)
POCT GLUCOSE: 112 MG/DL (ref 70–110)
POCT GLUCOSE: 131 MG/DL (ref 70–110)
POCT GLUCOSE: 137 MG/DL (ref 70–110)
POCT GLUCOSE: 58 MG/DL (ref 70–110)
POCT GLUCOSE: 60 MG/DL (ref 70–110)
POCT GLUCOSE: 62 MG/DL (ref 70–110)
POCT GLUCOSE: 62 MG/DL (ref 70–110)
POCT GLUCOSE: 71 MG/DL (ref 70–110)
POCT GLUCOSE: 72 MG/DL (ref 70–110)
POCT GLUCOSE: 73 MG/DL (ref 70–110)
POTASSIUM SERPL-SCNC: 3.7 MMOL/L (ref 3.5–5.1)
POTASSIUM SERPL-SCNC: 3.7 MMOL/L (ref 3.5–5.1)
PROT SERPL-MCNC: 5.1 G/DL (ref 6–8.4)
PROT SERPL-MCNC: 5.6 G/DL (ref 6–8.4)
RBC # BLD AUTO: 3.06 M/UL (ref 4.6–6.2)
SODIUM SERPL-SCNC: 138 MMOL/L (ref 136–145)
SODIUM SERPL-SCNC: 143 MMOL/L (ref 136–145)
WBC # BLD AUTO: 11.14 K/UL (ref 3.9–12.7)

## 2024-03-25 PROCEDURE — 25000003 PHARM REV CODE 250

## 2024-03-25 PROCEDURE — 31720 CLEARANCE OF AIRWAYS: CPT

## 2024-03-25 PROCEDURE — 20600001 HC STEP DOWN PRIVATE ROOM

## 2024-03-25 PROCEDURE — 99233 SBSQ HOSP IP/OBS HIGH 50: CPT | Mod: ,,, | Performed by: SURGERY

## 2024-03-25 PROCEDURE — C9113 INJ PANTOPRAZOLE SODIUM, VIA: HCPCS

## 2024-03-25 PROCEDURE — 27000221 HC OXYGEN, UP TO 24 HOURS

## 2024-03-25 PROCEDURE — 63600175 PHARM REV CODE 636 W HCPCS

## 2024-03-25 PROCEDURE — 84100 ASSAY OF PHOSPHORUS: CPT

## 2024-03-25 PROCEDURE — 93010 ELECTROCARDIOGRAM REPORT: CPT | Mod: ,,, | Performed by: INTERNAL MEDICINE

## 2024-03-25 PROCEDURE — 94761 N-INVAS EAR/PLS OXIMETRY MLT: CPT

## 2024-03-25 PROCEDURE — 84100 ASSAY OF PHOSPHORUS: CPT | Mod: 91

## 2024-03-25 PROCEDURE — 99900035 HC TECH TIME PER 15 MIN (STAT)

## 2024-03-25 PROCEDURE — 80053 COMPREHEN METABOLIC PANEL: CPT

## 2024-03-25 PROCEDURE — 83735 ASSAY OF MAGNESIUM: CPT | Mod: 91

## 2024-03-25 PROCEDURE — 85025 COMPLETE CBC W/AUTO DIFF WBC: CPT

## 2024-03-25 PROCEDURE — 93005 ELECTROCARDIOGRAM TRACING: CPT

## 2024-03-25 PROCEDURE — 80053 COMPREHEN METABOLIC PANEL: CPT | Mod: 91

## 2024-03-25 PROCEDURE — 83735 ASSAY OF MAGNESIUM: CPT

## 2024-03-25 RX ORDER — CLONAZEPAM 0.25 MG/1
1 TABLET, ORALLY DISINTEGRATING ORAL 2 TIMES DAILY
Status: DISCONTINUED | OUTPATIENT
Start: 2024-03-25 | End: 2024-03-29 | Stop reason: HOSPADM

## 2024-03-25 RX ORDER — IBUPROFEN 200 MG
16 TABLET ORAL
Status: DISCONTINUED | OUTPATIENT
Start: 2024-03-25 | End: 2024-03-29 | Stop reason: HOSPADM

## 2024-03-25 RX ORDER — BISACODYL 10 MG/1
10 SUPPOSITORY RECTAL DAILY
Status: DISCONTINUED | OUTPATIENT
Start: 2024-03-25 | End: 2024-03-29 | Stop reason: HOSPADM

## 2024-03-25 RX ORDER — DEXTROSE MONOHYDRATE, SODIUM CHLORIDE, AND POTASSIUM CHLORIDE 50; 1.49; 4.5 G/1000ML; G/1000ML; G/1000ML
INJECTION, SOLUTION INTRAVENOUS CONTINUOUS
Status: DISCONTINUED | OUTPATIENT
Start: 2024-03-25 | End: 2024-03-29

## 2024-03-25 RX ORDER — DEXTROSE MONOHYDRATE 100 MG/ML
INJECTION, SOLUTION INTRAVENOUS CONTINUOUS
Status: DISCONTINUED | OUTPATIENT
Start: 2024-03-25 | End: 2024-03-25

## 2024-03-25 RX ORDER — POLYETHYLENE GLYCOL 3350 17 G/17G
17 POWDER, FOR SOLUTION ORAL DAILY
Status: DISCONTINUED | OUTPATIENT
Start: 2024-03-25 | End: 2024-03-29 | Stop reason: HOSPADM

## 2024-03-25 RX ORDER — ESCITALOPRAM OXALATE 20 MG/1
20 TABLET ORAL DAILY
Status: DISCONTINUED | OUTPATIENT
Start: 2024-03-25 | End: 2024-03-29 | Stop reason: HOSPADM

## 2024-03-25 RX ORDER — MAGNESIUM SULFATE HEPTAHYDRATE 40 MG/ML
2 INJECTION, SOLUTION INTRAVENOUS ONCE
Status: COMPLETED | OUTPATIENT
Start: 2024-03-25 | End: 2024-03-25

## 2024-03-25 RX ORDER — VALPROIC ACID 250 MG/5ML
250 SOLUTION ORAL EVERY 6 HOURS
Status: DISCONTINUED | OUTPATIENT
Start: 2024-03-25 | End: 2024-03-29 | Stop reason: HOSPADM

## 2024-03-25 RX ORDER — IBUPROFEN 200 MG
24 TABLET ORAL
Status: DISCONTINUED | OUTPATIENT
Start: 2024-03-25 | End: 2024-03-29 | Stop reason: HOSPADM

## 2024-03-25 RX ORDER — GLUCAGON 1 MG
1 KIT INJECTION
Status: DISCONTINUED | OUTPATIENT
Start: 2024-03-25 | End: 2024-03-29 | Stop reason: HOSPADM

## 2024-03-25 RX ADMIN — LAMOTRIGINE 100 MG: 100 TABLET ORAL at 11:03

## 2024-03-25 RX ADMIN — TRAMADOL HYDROCHLORIDE 50 MG: 50 TABLET, COATED ORAL at 04:03

## 2024-03-25 RX ADMIN — DEXTROSE MONOHYDRATE 125 ML: 10 INJECTION, SOLUTION INTRAVENOUS at 07:03

## 2024-03-25 RX ADMIN — POLYETHYLENE GLYCOL 3350 17 G: 17 POWDER, FOR SOLUTION ORAL at 10:03

## 2024-03-25 RX ADMIN — LAMOTRIGINE 100 MG: 100 TABLET ORAL at 10:03

## 2024-03-25 RX ADMIN — Medication 1 ENEMA: at 10:03

## 2024-03-25 RX ADMIN — RISPERIDONE 0.5 MG: 0.25 TABLET, FILM COATED ORAL at 11:03

## 2024-03-25 RX ADMIN — POTASSIUM CHLORIDE, DEXTROSE MONOHYDRATE AND SODIUM CHLORIDE: 150; 5; 450 INJECTION, SOLUTION INTRAVENOUS at 08:03

## 2024-03-25 RX ADMIN — VALPROIC ACID 250 MG: 250 SOLUTION ORAL at 05:03

## 2024-03-25 RX ADMIN — VALPROIC ACID 250 MG: 250 SOLUTION ORAL at 12:03

## 2024-03-25 RX ADMIN — ESCITALOPRAM OXALATE 20 MG: 5 TABLET, FILM COATED ORAL at 10:03

## 2024-03-25 RX ADMIN — DEXTROSE MONOHYDRATE 125 ML: 10 INJECTION, SOLUTION INTRAVENOUS at 03:03

## 2024-03-25 RX ADMIN — PANTOPRAZOLE SODIUM 40 MG: 40 INJECTION, POWDER, FOR SOLUTION INTRAVENOUS at 02:03

## 2024-03-25 RX ADMIN — OLANZAPINE 10 MG: 5 TABLET, FILM COATED ORAL at 11:03

## 2024-03-25 RX ADMIN — DEXTROSE MONOHYDRATE 125 ML: 10 INJECTION, SOLUTION INTRAVENOUS at 12:03

## 2024-03-25 RX ADMIN — VALPROIC ACID 250 MG: 250 SOLUTION ORAL at 11:03

## 2024-03-25 RX ADMIN — POTASSIUM CHLORIDE 40 MEQ: 7.46 INJECTION, SOLUTION INTRAVENOUS at 04:03

## 2024-03-25 RX ADMIN — CLONAZEPAM 1 MG: 0.25 TABLET, ORALLY DISINTEGRATING ORAL at 10:03

## 2024-03-25 RX ADMIN — ENOXAPARIN SODIUM 40 MG: 40 INJECTION SUBCUTANEOUS at 05:03

## 2024-03-25 RX ADMIN — PANTOPRAZOLE SODIUM 40 MG: 40 INJECTION, POWDER, FOR SOLUTION INTRAVENOUS at 09:03

## 2024-03-25 RX ADMIN — CLONAZEPAM 1 MG: 0.25 TABLET, ORALLY DISINTEGRATING ORAL at 11:03

## 2024-03-25 RX ADMIN — DIVALPROEX SODIUM 125 MG: 125 CAPSULE, COATED PELLETS ORAL at 05:03

## 2024-03-25 RX ADMIN — MAGNESIUM SULFATE HEPTAHYDRATE 2 G: 40 INJECTION, SOLUTION INTRAVENOUS at 09:03

## 2024-03-25 RX ADMIN — SODIUM CHLORIDE, POTASSIUM CHLORIDE, SODIUM LACTATE AND CALCIUM CHLORIDE 1000 ML: 600; 310; 30; 20 INJECTION, SOLUTION INTRAVENOUS at 03:03

## 2024-03-25 RX ADMIN — BISACODYL 10 MG: 10 SUPPOSITORY RECTAL at 02:03

## 2024-03-25 RX ADMIN — SODIUM CHLORIDE, POTASSIUM CHLORIDE, SODIUM LACTATE AND CALCIUM CHLORIDE: 600; 310; 30; 20 INJECTION, SOLUTION INTRAVENOUS at 10:03

## 2024-03-25 NOTE — PROGRESS NOTES
[facility]  General Surgery  Progress Note    Subjective:     History of Present Illness:  No notes on file    Post-Op Info:  * No surgery found *         Interval History: NAEON. Had a bowel movement after enema yesterday.  NG with 1300cc of output.  Making adequate urine.  Remains HDS.    Medications:  Continuous Infusions:   lactated ringers 100 mL/hr at 03/25/24 0700     Scheduled Meds:   divalproex  125 mg Oral Q8H    enoxparin  40 mg Subcutaneous Daily    lamoTRIgine  100 mg Per NG tube BID    OLANZapine  10 mg Per NG tube Nightly    pantoprazole  40 mg Intravenous BID    risperiDONE  0.5 mg Per NG tube QHS    sodium phosphates  1 enema Rectal Once     PRN Meds:acetaminophen, calcium gluconate IVPB, calcium gluconate IVPB, calcium gluconate IVPB, magnesium sulfate IVPB, magnesium sulfate IVPB, ondansetron, potassium chloride **AND** potassium chloride **AND** potassium chloride, prochlorperazine, sodium chloride 0.9%, sodium phosphate 15 mmol in dextrose 5 % (D5W) 250 mL IVPB, sodium phosphate 20.01 mmol in dextrose 5 % (D5W) 250 mL IVPB, sodium phosphate 30 mmol in dextrose 5 % (D5W) 250 mL IVPB     Review of patient's allergies indicates:  No Known Allergies  Objective:     Vital Signs (Most Recent):  Temp: 97.9 °F (36.6 °C) (03/25/24 0345)  Pulse: 100 (03/25/24 0615)  Resp: 17 (03/25/24 0615)  BP: (!) 146/63 (03/25/24 0605)  SpO2: 100 % (03/25/24 0615) Vital Signs (24h Range):  Temp:  [97.9 °F (36.6 °C)-98.3 °F (36.8 °C)] 97.9 °F (36.6 °C)  Pulse:  [] 100  Resp:  [0-46] 17  SpO2:  [89 %-100 %] 100 %  BP: ()/(52-83) 146/63     Weight: 63 kg (139 lb)  Body mass index is 22.44 kg/m².    Intake/Output - Last 3 Shifts         03/23 0700 03/24 0659 03/24 0700 03/25 0659 03/25 0700  03/26 0659    I.V. (mL/kg)  2050 (32.5) 138.3 (2.2)    NG/GT  200     IV Piggyback  2789 138.3    Total Intake(mL/kg)  5039 (80) 276.7 (4.4)    Urine (mL/kg/hr)  850 (0.6)     Drains  1300      Other  0     Stool  0     Total Output  2150     Net  +2889 +276.7           Urine Occurrence  1 x     Stool Occurrence  1 x             Physical Exam  Vitals reviewed.   Constitutional:       Appearance: Normal appearance.   HENT:      Nose:      Comments: NGT in place  Cardiovascular:      Rate and Rhythm: Normal rate and regular rhythm.   Pulmonary:      Effort: Pulmonary effort is normal. No respiratory distress.   Abdominal:      General: There is distension.      Palpations: Abdomen is soft.      Tenderness: There is no abdominal tenderness.   Skin:     General: Skin is warm.   Neurological:      General: No focal deficit present.      Mental Status: He is alert and oriented to person, place, and time.          Significant Labs:  I have reviewed all pertinent lab results within the past 24 hours.  CBC:   Recent Labs   Lab 03/25/24  0300   WBC 11.14   RBC 3.06*   HGB 9.6*   HCT 29.6*      MCV 97   MCH 31.4*   MCHC 32.4       CMP:   Recent Labs   Lab 03/25/24  0300   GLU 67*   CALCIUM 8.3*   ALBUMIN 2.1*   PROT 5.1*      K 3.7   CO2 26      BUN 16   CREATININE 0.7   ALKPHOS 126   ALT 24   AST 26   BILITOT 0.4         Significant Diagnostics:  I have reviewed all pertinent imaging results/findings within the past 24 hours.  Assessment/Plan:     * Ileus  59 yoM with PMH including cerebral palsy transferred for concern of ischemic bowel and GI bleed. Patient likely has chronic ileus given neurologic and psych history. Lactate down trending with rehydration. Passing flatus.    - Can consider NG removal if continuing to have bowel function  - Recommend continued crystalloid resuscitation  - Hold xarelto given [facility] concern for GI bleed, H/H drop today to 9.6 from 12.1  - PPI  - Continue zosyn given leukocytosis  - Trend lab work, including lactate until normal  - Give another enema today        Sudeep Soriano MD  General Surgery  [facility] - [facility]

## 2024-03-25 NOTE — SUBJECTIVE & OBJECTIVE
Interval History: NAEON. Had a bowel movement after enema yesterday.  NG with 1300cc of output.  Making adequate urine.  Remains HDS.    Medications:  Continuous Infusions:   lactated ringers 100 mL/hr at 03/25/24 0700     Scheduled Meds:   divalproex  125 mg Oral Q8H    enoxparin  40 mg Subcutaneous Daily    lamoTRIgine  100 mg Per NG tube BID    OLANZapine  10 mg Per NG tube Nightly    pantoprazole  40 mg Intravenous BID    risperiDONE  0.5 mg Per NG tube QHS    sodium phosphates  1 enema Rectal Once     PRN Meds:acetaminophen, calcium gluconate IVPB, calcium gluconate IVPB, calcium gluconate IVPB, magnesium sulfate IVPB, magnesium sulfate IVPB, ondansetron, potassium chloride **AND** potassium chloride **AND** potassium chloride, prochlorperazine, sodium chloride 0.9%, sodium phosphate 15 mmol in dextrose 5 % (D5W) 250 mL IVPB, sodium phosphate 20.01 mmol in dextrose 5 % (D5W) 250 mL IVPB, sodium phosphate 30 mmol in dextrose 5 % (D5W) 250 mL IVPB     Review of patient's allergies indicates:  No Known Allergies  Objective:     Vital Signs (Most Recent):  Temp: 97.9 °F (36.6 °C) (03/25/24 0345)  Pulse: 100 (03/25/24 0615)  Resp: 17 (03/25/24 0615)  BP: (!) 146/63 (03/25/24 0605)  SpO2: 100 % (03/25/24 0615) Vital Signs (24h Range):  Temp:  [97.9 °F (36.6 °C)-98.3 °F (36.8 °C)] 97.9 °F (36.6 °C)  Pulse:  [] 100  Resp:  [0-46] 17  SpO2:  [89 %-100 %] 100 %  BP: ()/(52-83) 146/63     Weight: 63 kg (139 lb)  Body mass index is 22.44 kg/m².    Intake/Output - Last 3 Shifts         03/23 0700  03/24 0659 03/24 0700 03/25 0659 03/25 0700 03/26 0659    I.V. (mL/kg)  2050 (32.5) 138.3 (2.2)    NG/GT  200     IV Piggyback  2789 138.3    Total Intake(mL/kg)  5039 (80) 276.7 (4.4)    Urine (mL/kg/hr)  850 (0.6)     Drains  1300     Other  0     Stool  0     Total Output  2150     Net  +2889 +276.7           Urine Occurrence  1 x     Stool Occurrence  1 x              Physical Exam  Vitals reviewed.    Constitutional:       Appearance: Normal appearance.   HENT:      Nose:      Comments: NGT in place  Cardiovascular:      Rate and Rhythm: Normal rate and regular rhythm.   Pulmonary:      Effort: Pulmonary effort is normal. No respiratory distress.   Abdominal:      General: There is distension.      Palpations: Abdomen is soft.      Tenderness: There is no abdominal tenderness.   Skin:     General: Skin is warm.   Neurological:      General: No focal deficit present.      Mental Status: He is alert and oriented to person, place, and time.          Significant Labs:  I have reviewed all pertinent lab results within the past 24 hours.  CBC:   Recent Labs   Lab 03/25/24  0300   WBC 11.14   RBC 3.06*   HGB 9.6*   HCT 29.6*      MCV 97   MCH 31.4*   MCHC 32.4       CMP:   Recent Labs   Lab 03/25/24  0300   GLU 67*   CALCIUM 8.3*   ALBUMIN 2.1*   PROT 5.1*      K 3.7   CO2 26      BUN 16   CREATININE 0.7   ALKPHOS 126   ALT 24   AST 26   BILITOT 0.4         Significant Diagnostics:  I have reviewed all pertinent imaging results/findings within the past 24 hours.

## 2024-03-25 NOTE — NURSING
Pt had an episode of prolonged and profound coughing, leading to expulsion of a large quantity of white frothy liquid similar in appearance to saliva. Subsequently, pt's breath sounds developed a wet quality grossly audible without stethoscope. Immediately after this event pt was satting 94% on 3 LNC. SICU notified, RT contacted for suction. Will continue to monitor.

## 2024-03-25 NOTE — NURSING
Dr. Hammer notified of third POCT blood glucose <70 today; no new orders. Plan to continue POCT glucose Q4H and bolus with D10 as needed.

## 2024-03-25 NOTE — ASSESSMENT & PLAN NOTE
59 yoM with PMH including cerebral palsy transferred for concern of ischemic bowel and GI bleed. Patient likely has chronic ileus given neurologic and psych history. Lactate down trending with rehydration. Passing flatus.    - Can consider NG removal if continuing to have bowel function  - Recommend continued crystalloid resuscitation  - Hold xarelto given [facility] concern for GI bleed, H/H drop today to 9.6 from 12.1  - PPI  - Continue zosyn given leukocytosis  - Trend lab work, including lactate until normal  - Give another enema today

## 2024-03-25 NOTE — PLAN OF CARE
[facility] Int[facility]  Initial Discharge Assessment    SW met w/patient in room for Discharge Planning Assessment. Patient was unable to answer questions 2/2 AMS. SW spoke w/patient's sister and legal guardian, Eleanor Topete ([phone]) via phone to complete assessment. Per sister, patient lives alone in a 2nd floor apartment. Per sister, patient was total care w/ADLs and used a wheelchair / rollator for ambulation. Per sister, patient has 24/7 caregivers via [facility]. Patient will have assistance from sister upon discharge. SW discussed various levels of post-acute care. All questions addressed. SW will follow for needs.    Primary Care Provider: Krishan Massey II, NP    Admission Diagnosis: Ileus [K56.7]    Admission Date: 3/24/2024  Expected Discharge Date: 4/7/2024    Transition of Care Barriers: (P) Underinsured    Payor: MEDICAID / Plan: MEDICAID [place] / Product Type: Government /     Extended Emergency Contact Information  Primary Emergency Contact: EfremEleanor   [place]  Mobile Phone: [phone]  Relation: Sister  Secondary Emergency Contact: Ailyn Beatty   [place]  Mobile Phone: [phone]  Relation: Relative    Discharge Plan A: (P) Home with family  Discharge Plan B: (P) Home, Home with family      [facility]  [place] - [address]  [address] [address]  Phone: [phone] Fax: [phone]      Initial Assessment (most recent)       Adult Discharge Assessment - 03/25/24 1511          Discharge Assessment    Assessment Type Discharge Planning Assessment     Confirmed/corrected address, phone number and insurance Yes     Confirmed Demographics Correct on Facesheet     Source of Information family   Eleanor Topete (Sister / Legal Guardian) [phone]    If unable to respond/provide information was family/caregiver contacted? Yes     Contact Name/Number Eleanor Topete (Sister / Legal  Guardian) [phone]     Communicated SANDY with patient/caregiver Date not available/Unable to determine     Reason For Admission Ileus     People in Home alone     Facility Arrived From: [facility] ED     Do you expect to return to your current living situation? Yes     Do you have help at home or someone to help you manage your care at home? Yes     Who are your caregiver(s) and their phone number(s)? Eleanor Topete (Sister / Legal Guardian) [phone]     Prior to hospitilization cognitive status: Unable to Assess     Current cognitive status: Not Oriented to Place;Not Oriented to Time (P)      Walking or Climbing Stairs Difficulty yes (P)      Walking or Climbing Stairs ambulation difficulty, requires equipment (P)      Mobility Management Wheelchair and rollator (P)      Dressing/Bathing Difficulty yes (P)      Dressing/Bathing dressing difficulty, assistance 1 person;bathing difficulty, assistance 1 person (P)      Do you have any problems with: -- (P)    Family and caregivers assist.    Home Layout Able to live on 1st floor (P)      Equipment Currently Used at Home wheelchair;rollator (P)      Readmission within 30 days? Yes (P)      Patient currently being followed by outpatient case management? Unable to determine (comments) (P)      Do you currently have service(s) that help you manage your care at home? Yes (P)      How Many hours does patient receive services -- (P)    24/7    Name and Contact number of agency [facility] (P)      Is the pt/caregiver preference to resume services with current agency Yes (P)      Do you take prescription medications? Yes (P)      Do you have prescription coverage? Yes (P)      Coverage Medicaid of [place] (P)      Do you have any problems affording any of your prescribed medications? TBD (P)      Who is going to help you get home at discharge? Eleanor Topete (Sister / Legal Guardian) [phone] (P)      How do you get to doctors appointments? family or  friend will provide (P)      Are you on dialysis? No (P)      Do you take coumadin? No (P)      Discharge Plan A Home with family (P)      Discharge Plan B Home;Home with family (P)      DME Needed Upon Discharge  other (see comments) (P)    TBD    Discharge Plan discussed with: Sibling (P)      Name(s) and Number(s) Eleanor Topete (Sister / Legal Guardian) [phone] (P)      Transition of Care Barriers Underinsured (P)         Physical Activity    On average, how many days per week do you engage in moderate to strenuous exercise (like a brisk walk)? 0 days (P)      On average, how many minutes do you engage in exercise at this level? 0 min (P)         Financial Resource Strain    How hard is it for you to pay for the very basics like food, housing, medical care, and heating? Not hard at all (P)         Housing Stability    In the last 12 months, was there a time when you were not able to pay the mortgage or rent on time? No (P)      In the last 12 months, how many places have you lived? 1 (P)      In the last 12 months, was there a time when you did not have a steady place to sleep or slept in a shelter (including now)? No (P)         Transportation Needs    In the past 12 months, has lack of transportation kept you from medical appointments or from getting medications? No (P)      In the past 12 months, has lack of transportation kept you from meetings, work, or from getting things needed for daily living? No (P)         Food Insecurity    Within the past 12 months, you worried that your food would run out before you got the money to buy more. Never true (P)      Within the past 12 months, the food you bought just didn't last and you didn't have money to get more. Never true (P)         Stress    Do you feel stress - tense, restless, nervous, or anxious, or unable to sleep at night because your mind is troubled all the time - these days? To some extent (P)         Social Connections    In a typical week, how  many times do you talk on the phone with family, friends, or neighbors? Three times a week (P)      How often do you get together with friends or relatives? Three times a week (P)      How often do you attend Hindu or Synagogue services? Never (P)      Do you belong to any clubs or organizations such as Hindu groups, unions, fraternal or athletic groups, or school groups? No (P)      How often do you attend meetings of the clubs or organizations you belong to? Never (P)      Are you , , , , never , or living with a partner? Never  (P)         Alcohol Use    Q1: How often do you have a drink containing alcohol? Never (P)      Q2: How many drinks containing alcohol do you have on a typical day when you are drinking? Patient does not drink (P)      Q3: How often do you have six or more drinks on one occasion? Never (P)                      Readmission Assessment (most recent)       Readmission Assessment - 03/25/24 1512          Readmission    Why were you hospitalized in the last 30 days? DVT; falls; GI bleed     Why were you readmitted? Related to previous admission     Did patient/caregiver refused recommended DC plan? No     Was this a planned readmission? No                    Discharge Plan A and Plan B have been determined by review of patient's clinical status, future medical and therapeutic needs, and coverage/benefits for post-acute care in coordination with multidisciplinary team members.    NORY Oneil, [facility]    Case Management Department  [facility]

## 2024-03-25 NOTE — PROGRESS NOTES
[facility] - [facility]  Critical Care - Surgery  Progress Note    Patient Name: Edinson Umanzor  MRN: [id number]  Admission Date: 3/24/2024  Hospital Length of Stay: 1 days  Code Status: Full Code  Attending Provider: Henrry Saldivar MD  Primary Care Provider: Krishan Massey II, NP   Principal Problem: Ileus    Subjective:     Hospital/ICU Course:  No notes on file    Interval History/Significant Events: AF, Tachycardic overnight to the 130's that improved with Tramadol,episode of hypotension requiring 1L bolus. Oxygenating well on 2L NC after airway suctioning overnight. Received Enema yesterday with bowel movement. NG in place with 1300 output in last 24 documented, per nurse 1400 out overnight. Hg 9.6 from 12.        Objective:     Vital Signs (Most Recent):  Temp: 97.9 °F (36.6 °C) (03/25/24 0345)  Pulse: 99 (03/25/24 0600)  Resp: (!) 22 (03/25/24 0600)  BP: (!) 146/63 (03/25/24 0605)  SpO2: 100 % (03/25/24 0600) Vital Signs (24h Range):  Temp:  [97.9 °F (36.6 °C)-98.3 °F (36.8 °C)] 97.9 °F (36.6 °C)  Pulse:  [] 99  Resp:  [12-46] 22  SpO2:  [89 %-100 %] 100 %  BP: ()/(52-83) 146/63     Weight: 63 kg (139 lb)  Body mass index is 22.44 kg/m².      Intake/Output Summary (Last 24 hours) at 3/25/2024 0623  Last data filed at 3/25/2024 0606  Gross per 24 hour   Intake 5038.99 ml   Output 2150 ml   Net 2888.99 ml          Physical Exam  Constitutional:       Appearance: Normal appearance.      Comments: Flat affect   Cardiovascular:      Rate and Rhythm: Regular rhythm. Tachycardia present.   Pulmonary:      Effort: Pulmonary effort is normal. No respiratory distress.   Abdominal:      General: There is distension.      Tenderness: There is no abdominal tenderness.      Comments: NGT to LIWS with bilious output  Abdomen is distended but soft in all 4 quadrants. No pain verbalized by patient or grimacing to demonstrate discomfort on palpation. No rebound or rigidity.  Well-healed  laparotomy, L flank incisional scars   Genitourinary:     Comments: Adult diaper  Musculoskeletal:      Comments: Contracted upper extremities   Skin:     General: Skin is warm and dry.   Neurological:      Mental Status: He is alert and oriented to person, place, and time. Mental status is at baseline.            Vents:       Lines/Drains/Airways       Drain  Duration                  NG/OG Tube 03/23/24 1710 Right nostril 1 day    Male External Urinary Catheter 03/24/24 0745 Small <1 day              Peripheral Intravenous Line  Duration                  Peripheral IV - Single Lumen 03/22/24 1157 22 G Left;Posterior Hand 2 days         Peripheral IV - Single Lumen 03/23/24 2204 20 G Anterior;Left;Proximal Forearm 1 day                    Significant Labs:    CBC/Anemia Profile:  Recent Labs   Lab 03/24/24  0417 03/25/24  0300   WBC 15.85* 11.14   HGB 12.1* 9.6*   HCT 37.8* 29.6*    214   MCV 97 97   RDW 14.7* 15.0*        Chemistries:  Recent Labs   Lab 03/24/24  0417 03/24/24  1703 03/25/24  0300     --  143   K 3.3* 3.8 3.7     --  109   CO2 23  --  26   BUN 24*  --  16   CREATININE 0.8  --  0.7   CALCIUM 8.3*  --  8.3*   ALBUMIN 2.5*  --  2.1*   PROT 6.1  --  5.1*   BILITOT 0.5  --  0.4   ALKPHOS 155*  --  126   ALT 30  --  24   AST 30  --  26   MG 1.8  --  1.9   PHOS 3.8  --  3.0       All pertinent labs within the past 24 hours have been reviewed.    Significant Imaging:  I have reviewed all pertinent imaging results/findings within the past 24 hours.  Assessment/Plan:     GI  Hematemesis  59M with a PMH of anxiety/depression, bipolar 1, schizophrenia, cerebral palsy, profound mental delay, BPH, HTN, & HLD with recently diagnosed DVT on xarelto presented to OSH with concerns for GIB, abdominal exam prompted CT A/P with findings concerning for early ischemic process. He is transferred to [facility] for higher level of care. However on arrival, his abdominal exam is reassuring and CT A/P may  represent [place]'s syndrome. We will closely monitor his labs and exam and continue treatment for GIB at this time.    Neuro/Psych:   - Sedation: none  - Pain: tylenol 650 PRN, tramadol 50 PRN   - Home medications resumed as able via NGT:   divalproex 125 q8h, lamotrigine 100 BID, risperdal 0.5 qhs, olanzapine 10 qhs             Cardiac:   - BP Goal: MAP > 65  - Pressors: none  - Rhythm: NSR, tachycardia  - Resuscitation w/ 1L LR bolus + continuous mIVF@100 hr, unclear how much received at OSH    Pulmonary:   - On RA  - Goal SpO2 >92%  - Oxygenating well on 2LNC, wean as tolerated      Renal:  Recent Labs   Lab 03/23/24  0456 03/24/24  0417 03/25/24  0300   BUN 25* 24* 16   CREATININE 0.83 0.8 0.7     - Trend BUN/Cr   - Cr 0.7 from 0.8    FEN / GI:   - Daily CMP, PRN K/Mag/Phos per protocol   - Replace electrolytes as needed  - Nutrition: NPO   - NGT to LIWS  - GI ppx: BID PPI IV  - Consider GI consult for EGD if concern for UGI bleed remains.  - Bowel Regimen: None while NGT in place, repeat enema today, possible addition of Miralax and Docusate per NGT/PO  - Possible removal of NG today, will trend output  - Repeat enema today   ID:   - Afebrile  Recent Labs   Lab 03/23/24  0456 03/24/24  0417 03/25/24  0300   WBC 7.10 15.85* 11.14     - Abx: None    Heme/Onc:   Recent Labs   Lab 03/23/24  0456 03/24/24  0417 03/25/24  0300   HGB 12.6* 12.1* 9.6*    283 214   APTT  --  26.8  --    INR  --  1.1  --       - Pt on home xarelto for recently dx DVT in the LLE.    - Therapeutic anticoagulation held for now in setting of presumed GIB  - CBC, PTT daily   - DVT ppx: lovenox 40 qd      Endocrine:   - CCM Goal BG <180  - POCT glucose frequency per protocol     PPx:   Feeding: NPO  Analgesia/Sedation: PRNs/none  Thromboembolic Prevention: lovenox ppx dosing, known LE DVT  HOB >30: Yes  Stress Ulcer: BID PPI  Glucose Control: Will monitor as necessary     Lines/Drains/Airway:  NGT     Dispo/Code Status/Palliative:   -  Step down to floor today   - Full Code           Jose Antonio Romano, DO  Critical Care - Surgery  [facility] - [facility]

## 2024-03-25 NOTE — ASSESSMENT & PLAN NOTE
59M with a PMH of anxiety/depression, bipolar 1, schizophrenia, cerebral palsy, profound mental delay, BPH, HTN, & HLD with recently diagnosed DVT on xarelto presented to OSH with concerns for GIB, abdominal exam prompted CT A/P with findings concerning for early ischemic process. [place] [facility] [place]. However on arrival, his abdominal exam is reassuring and CT A/P may represent [place]'s syndrome. We will closely monitor his labs and exam and continue treatment for GIB at this time.    Neuro/Psych:   - Sedation: none  - Pain: tylenol 650 PRN, tramadol 50 PRN   - Home medications resumed as able via NGT:   divalproex 125 q8h, lamotrigine 100 BID, risperdal 0.5 qhs, olanzapine 10 qhs             Cardiac:   - BP Goal: MAP > 65  - Pressors: none  - Rhythm: NSR, tachycardia  - Resuscitation w/ 1L LR bolus + continuous mIVF@100 hr, unclear how much received at OSH    Pulmonary:   - On RA  - Goal SpO2 >92%  - Oxygenating well on 2LNC, wean as tolerated      Renal:  Recent Labs   Lab 03/23/24  0456 03/24/24 0417 03/25/24  0300   BUN 25* 24* 16   CREATININE 0.83 0.8 0.7     - Trend BUN/Cr   - Cr 0.7 from 0.8    FEN / GI:   - Daily CMP, PRN K/Mag/Phos per protocol   - Replace electrolytes as needed  - Nutrition: NPO   - NGT to LIWS  - GI ppx: BID PPI IV  - Consider GI consult for EGD if concern for UGI bleed remains.  - Bowel Regimen: None while NGT in place, repeat enema today, possible addition of Miralax and Docusate per NGT/PO  - Repeat enema today     ID:   - Afebrile  Recent Labs   Lab 03/23/24  0456 03/24/24  0417 03/25/24  0300   WBC 7.10 15.85* 11.14     - Abx: None    Heme/Onc:   Recent Labs   Lab 03/23/24  0456 03/24/24  0417 03/25/24  0300   HGB 12.6* 12.1* 9.6*    283 214   APTT  --  26.8  --    INR  --  1.1  --       - Pt on home xarelto for recently dx DVT in the LLE.    - Therapeutic anticoagulation held for now in setting of presumed GIB  - CBC, PTT daily   - DVT ppx:  lovenox 40 qd      Endocrine:   - CCM Goal BG <180  - POCT glucose frequency per protocol     PPx:   Feeding: NPO  Analgesia/Sedation: PRNs/none  Thromboembolic Prevention: lovenox ppx dosing, known LE DVT  HOB >30: Yes  Stress Ulcer: BID PPI  Glucose Control: Will monitor as necessary     Lines/Drains/Airway:  NGT     Dispo/Code Status/Palliative:   - Step down to floor today   - Full Code

## 2024-03-25 NOTE — SUBJECTIVE & OBJECTIVE
Interval History/Significant Events: AF, Tachycardic overnight to the 130's, episode of hypotension requiring 1L bolus. Oxygenating well on 2L NC. Received Enema yesterday with bowel movement. NG in place with 1300 output in last 24.         Objective:     Vital Signs (Most Recent):  Temp: 97.9 °F (36.6 °C) (03/25/24 0345)  Pulse: 99 (03/25/24 0600)  Resp: (!) 22 (03/25/24 0600)  BP: (!) 146/63 (03/25/24 0605)  SpO2: 100 % (03/25/24 0600) Vital Signs (24h Range):  Temp:  [97.9 °F (36.6 °C)-98.3 °F (36.8 °C)] 97.9 °F (36.6 °C)  Pulse:  [] 99  Resp:  [12-46] 22  SpO2:  [89 %-100 %] 100 %  BP: ()/(52-83) 146/63     Weight: 63 kg (139 lb)  Body mass index is 22.44 kg/m².      Intake/Output Summary (Last 24 hours) at 3/25/2024 0623  Last data filed at 3/25/2024 0606  Gross per 24 hour   Intake 5038.99 ml   Output 2150 ml   Net 2888.99 ml          Physical Exam  Constitutional:       Appearance: Normal appearance.      Comments: Flat affect   Cardiovascular:      Rate and Rhythm: Regular rhythm. Tachycardia present.   Pulmonary:      Effort: Pulmonary effort is normal. No respiratory distress.   Abdominal:      General: There is distension.      Tenderness: There is no abdominal tenderness.      Comments: NGT to LIWS with drain output  Abdomen is distended but soft in all 4 quadrants. No pain verbalized by patient or grimacing to demonstrate discomfort on palpation. No rebound or rigidity.  Well-healed laparotomy, L flank incisional scars   Genitourinary:     Comments: Adult diaper  Musculoskeletal:      Comments: Contracted upper extremities   Skin:     General: Skin is warm and dry.   Neurological:      Mental Status: He is alert and oriented to person, place, and time. Mental status is at baseline.            Vents:       Lines/Drains/Airways       Drain  Duration                  NG/OG Tube 03/23/24 1710 Right nostril 1 day    Male External Urinary Catheter 03/24/24 0745 Small <1 day               Peripheral Intravenous Line  Duration                  Peripheral IV - Single Lumen 03/22/24 1157 22 G Left;Posterior Hand 2 days         Peripheral IV - Single Lumen 03/23/24 2204 20 G Anterior;Left;Proximal Forearm 1 day                    Significant Labs:    CBC/Anemia Profile:  Recent Labs   Lab 03/24/24  0417 03/25/24  0300   WBC 15.85* 11.14   HGB 12.1* 9.6*   HCT 37.8* 29.6*    214   MCV 97 97   RDW 14.7* 15.0*        Chemistries:  Recent Labs   Lab 03/24/24  0417 03/24/24  1703 03/25/24  0300     --  143   K 3.3* 3.8 3.7     --  109   CO2 23  --  26   BUN 24*  --  16   CREATININE 0.8  --  0.7   CALCIUM 8.3*  --  8.3*   ALBUMIN 2.5*  --  2.1*   PROT 6.1  --  5.1*   BILITOT 0.5  --  0.4   ALKPHOS 155*  --  126   ALT 30  --  24   AST 30  --  26   MG 1.8  --  1.9   PHOS 3.8  --  3.0       All pertinent labs within the past 24 hours have been reviewed.    Significant Imaging:  I have reviewed all pertinent imaging results/findings within the past 24 hours.

## 2024-03-25 NOTE — PLAN OF CARE
"SICU PLAN OF CARE NOTE    Dx: Ileus    Goals of Care:  MAP >65    Vital Signs:  BP (!) 152/67   Pulse 95   Temp 98.4 °F (36.9 °C) (Oral)   Resp (!) 25   Ht 5' 6" (1.676 m)   Wt 63 kg (139 lb)   SpO2 100%   BMI 22.44 kg/m²     Cardiac:  NSR    Resp:  SpO2 99% on room air    Neuro:  Follows Commands and Moves All Extremities, oriented to self    Gtts:  MIVF    Urine Output:  external Urinary Catheter 1300 cc/shift    Drains:  NG/OG Tube 350 cc /  shift    Diet:  NPO       Labs/Accuchecks:  q4h    Skin:  Other than skin integrity alterations present on admission, skin remains free of injury.   Patient turned Q2, waffle mattress inflated, ICU bed working correctly.    Shift Events:  Hypoglycemia noted with each POCT glucose check; D10 boluses given. Team aware; orders to continue as needed D10 boluses.  See flowsheet for further assessment/details.  Family (brother) updated on current condition/plan of care, questions answered, and emotional support provided.  MD updated on current condition, vitals, labs, and gtts. I attempted to contact [place],  Umanzor's legal guardian regarding transfer to [place] [phone] but was unable to get an answer or leave a message.   "

## 2024-03-26 LAB
ALBUMIN SERPL BCP-MCNC: 2.1 G/DL (ref 3.5–5.2)
ALP SERPL-CCNC: 128 U/L (ref 55–135)
ALT SERPL W/O P-5'-P-CCNC: 24 U/L (ref 10–44)
ANION GAP SERPL CALC-SCNC: 5 MMOL/L (ref 8–16)
AST SERPL-CCNC: 26 U/L (ref 10–40)
BASOPHILS # BLD AUTO: 0.02 K/UL (ref 0–0.2)
BASOPHILS NFR BLD: 0.3 % (ref 0–1.9)
BILIRUB SERPL-MCNC: 0.4 MG/DL (ref 0.1–1)
BUN SERPL-MCNC: 6 MG/DL (ref 8–23)
CALCIUM SERPL-MCNC: 8.6 MG/DL (ref 8.7–10.5)
CHLORIDE SERPL-SCNC: 108 MMOL/L (ref 95–110)
CO2 SERPL-SCNC: 26 MMOL/L (ref 23–29)
CREAT SERPL-MCNC: 0.6 MG/DL (ref 0.5–1.4)
DIFFERENTIAL METHOD BLD: ABNORMAL
EOSINOPHIL # BLD AUTO: 0.2 K/UL (ref 0–0.5)
EOSINOPHIL NFR BLD: 2.7 % (ref 0–8)
ERYTHROCYTE [DISTWIDTH] IN BLOOD BY AUTOMATED COUNT: 14.6 % (ref 11.5–14.5)
EST. GFR  (NO RACE VARIABLE): >60 ML/MIN/1.73 M^2
GLUCOSE SERPL-MCNC: 76 MG/DL (ref 70–110)
HCT VFR BLD AUTO: 31.5 % (ref 40–54)
HGB BLD-MCNC: 10.1 G/DL (ref 14–18)
IMM GRANULOCYTES # BLD AUTO: 0.06 K/UL (ref 0–0.04)
IMM GRANULOCYTES NFR BLD AUTO: 0.9 % (ref 0–0.5)
LYMPHOCYTES # BLD AUTO: 1.2 K/UL (ref 1–4.8)
LYMPHOCYTES NFR BLD: 17.8 % (ref 18–48)
MAGNESIUM SERPL-MCNC: 2.2 MG/DL (ref 1.6–2.6)
MCH RBC QN AUTO: 31 PG (ref 27–31)
MCHC RBC AUTO-ENTMCNC: 32.1 G/DL (ref 32–36)
MCV RBC AUTO: 97 FL (ref 82–98)
MONOCYTES # BLD AUTO: 0.6 K/UL (ref 0.3–1)
MONOCYTES NFR BLD: 8.9 % (ref 4–15)
NEUTROPHILS # BLD AUTO: 4.7 K/UL (ref 1.8–7.7)
NEUTROPHILS NFR BLD: 69.4 % (ref 38–73)
NRBC BLD-RTO: 0 /100 WBC
PHOSPHATE SERPL-MCNC: 3.2 MG/DL (ref 2.7–4.5)
PLATELET # BLD AUTO: 172 K/UL (ref 150–450)
PMV BLD AUTO: 11.1 FL (ref 9.2–12.9)
POCT GLUCOSE: 101 MG/DL (ref 70–110)
POCT GLUCOSE: 79 MG/DL (ref 70–110)
POCT GLUCOSE: 87 MG/DL (ref 70–110)
POCT GLUCOSE: 91 MG/DL (ref 70–110)
POCT GLUCOSE: 95 MG/DL (ref 70–110)
POTASSIUM SERPL-SCNC: 4.1 MMOL/L (ref 3.5–5.1)
PROT SERPL-MCNC: 4.9 G/DL (ref 6–8.4)
RBC # BLD AUTO: 3.26 M/UL (ref 4.6–6.2)
SODIUM SERPL-SCNC: 139 MMOL/L (ref 136–145)
WBC # BLD AUTO: 6.76 K/UL (ref 3.9–12.7)

## 2024-03-26 PROCEDURE — 20600001 HC STEP DOWN PRIVATE ROOM

## 2024-03-26 PROCEDURE — 25000003 PHARM REV CODE 250

## 2024-03-26 PROCEDURE — 80053 COMPREHEN METABOLIC PANEL: CPT

## 2024-03-26 PROCEDURE — 36415 COLL VENOUS BLD VENIPUNCTURE: CPT

## 2024-03-26 PROCEDURE — 25000003 PHARM REV CODE 250: Performed by: STUDENT IN AN ORGANIZED HEALTH CARE EDUCATION/TRAINING PROGRAM

## 2024-03-26 PROCEDURE — 85025 COMPLETE CBC W/AUTO DIFF WBC: CPT

## 2024-03-26 PROCEDURE — 63600175 PHARM REV CODE 636 W HCPCS

## 2024-03-26 PROCEDURE — C9113 INJ PANTOPRAZOLE SODIUM, VIA: HCPCS

## 2024-03-26 PROCEDURE — 99232 SBSQ HOSP IP/OBS MODERATE 35: CPT | Mod: ,,, | Performed by: STUDENT IN AN ORGANIZED HEALTH CARE EDUCATION/TRAINING PROGRAM

## 2024-03-26 PROCEDURE — 84100 ASSAY OF PHOSPHORUS: CPT

## 2024-03-26 PROCEDURE — 83735 ASSAY OF MAGNESIUM: CPT

## 2024-03-26 RX ORDER — SYRING-NEEDL,DISP,INSUL,0.3 ML 29 G X1/2"
296 SYRINGE, EMPTY DISPOSABLE MISCELLANEOUS ONCE
Status: COMPLETED | OUTPATIENT
Start: 2024-03-26 | End: 2024-03-26

## 2024-03-26 RX ORDER — SYRING-NEEDL,DISP,INSUL,0.3 ML 29 G X1/2"
296 SYRINGE, EMPTY DISPOSABLE MISCELLANEOUS
Status: COMPLETED | OUTPATIENT
Start: 2024-03-26 | End: 2024-03-26

## 2024-03-26 RX ORDER — PSEUDOEPHEDRINE/ACETAMINOPHEN 30MG-500MG
100 TABLET ORAL
Status: COMPLETED | OUTPATIENT
Start: 2024-03-26 | End: 2024-03-26

## 2024-03-26 RX ADMIN — POLYETHYLENE GLYCOL 3350 17 G: 17 POWDER, FOR SOLUTION ORAL at 11:03

## 2024-03-26 RX ADMIN — POTASSIUM CHLORIDE, DEXTROSE MONOHYDRATE AND SODIUM CHLORIDE: 150; 5; 450 INJECTION, SOLUTION INTRAVENOUS at 11:03

## 2024-03-26 RX ADMIN — OLANZAPINE 10 MG: 5 TABLET, FILM COATED ORAL at 11:03

## 2024-03-26 RX ADMIN — MAGNESIUM CITRATE 296 ML: 1.75 LIQUID ORAL at 06:03

## 2024-03-26 RX ADMIN — POTASSIUM CHLORIDE, DEXTROSE MONOHYDRATE AND SODIUM CHLORIDE: 150; 5; 450 INJECTION, SOLUTION INTRAVENOUS at 10:03

## 2024-03-26 RX ADMIN — BISACODYL 10 MG: 10 SUPPOSITORY RECTAL at 11:03

## 2024-03-26 RX ADMIN — LAMOTRIGINE 100 MG: 100 TABLET ORAL at 11:03

## 2024-03-26 RX ADMIN — CLONAZEPAM 1 MG: 0.25 TABLET, ORALLY DISINTEGRATING ORAL at 11:03

## 2024-03-26 RX ADMIN — VALPROIC ACID 250 MG: 250 SOLUTION ORAL at 06:03

## 2024-03-26 RX ADMIN — ONDANSETRON 4 MG: 2 INJECTION INTRAMUSCULAR; INTRAVENOUS at 10:03

## 2024-03-26 RX ADMIN — PANTOPRAZOLE SODIUM 40 MG: 40 INJECTION, POWDER, FOR SOLUTION INTRAVENOUS at 10:03

## 2024-03-26 RX ADMIN — RISPERIDONE 0.5 MG: 0.25 TABLET, FILM COATED ORAL at 11:03

## 2024-03-26 RX ADMIN — SODIUM CHLORIDE 500 ML: 9 INJECTION, SOLUTION INTRAVENOUS at 06:03

## 2024-03-26 RX ADMIN — Medication 100 ML: at 06:03

## 2024-03-26 RX ADMIN — ESCITALOPRAM OXALATE 20 MG: 5 TABLET, FILM COATED ORAL at 11:03

## 2024-03-26 RX ADMIN — VALPROIC ACID 250 MG: 250 SOLUTION ORAL at 11:03

## 2024-03-26 RX ADMIN — ENOXAPARIN SODIUM 40 MG: 40 INJECTION SUBCUTANEOUS at 06:03

## 2024-03-26 RX ADMIN — VALPROIC ACID 250 MG: 250 SOLUTION ORAL at 12:03

## 2024-03-26 RX ADMIN — PANTOPRAZOLE SODIUM 40 MG: 40 INJECTION, POWDER, FOR SOLUTION INTRAVENOUS at 11:03

## 2024-03-26 RX ADMIN — MAGNESIUM CITRATE 296 ML: 1.75 LIQUID ORAL at 11:03

## 2024-03-26 NOTE — SUBJECTIVE & OBJECTIVE
Interval History: Stepped down to [facility] overnight. NGT inadvertently pulled back then advanced. Episode of coughing and desaturation with this. O2 >95% on 4L this AM.     Medications:  Continuous Infusions:   dextrose 5 % and 0.45 % NaCl with KCl 20 mEq 100 mL/hr at 03/25/24 2035     Scheduled Meds:   bisacodyL  10 mg Rectal Daily    clonazePAM  1 mg Per NG tube BID    enoxparin  40 mg Subcutaneous Daily    EScitalopram oxalate  20 mg Per NG tube Daily    lamoTRIgine  100 mg Per NG tube BID    magnesium citrate  296 mL Per NG tube Once    OLANZapine  10 mg Per NG tube Nightly    pantoprazole  40 mg Intravenous BID    polyethylene glycol  17 g Per NG tube Daily    risperiDONE  0.5 mg Per NG tube QHS    valproic acid (as sodium salt)  250 mg Per NG tube Q6H     PRN Meds:acetaminophen, dextrose 10%, dextrose 10%, glucagon (human recombinant), glucose, glucose, ondansetron, prochlorperazine, sodium chloride 0.9%     Review of patient's allergies indicates:  No Known Allergies  Objective:     Vital Signs (Most Recent):  Temp: 97.6 °F (36.4 °C) (03/26/24 0437)  Pulse: 95 (03/26/24 0700)  Resp: 18 (03/25/24 2045)  BP: 125/71 (03/26/24 0437)  SpO2: 98 % (03/26/24 0437) Vital Signs (24h Range):  Temp:  [97.4 °F (36.3 °C)-98.9 °F (37.2 °C)] 97.6 °F (36.4 °C)  Pulse:  [] 95  Resp:  [13-27] 18  SpO2:  [96 %-100 %] 98 %  BP: (125-158)/(60-76) 125/71     Weight: 63 kg (139 lb)  Body mass index is 22.44 kg/m².    Intake/Output - Last 3 Shifts         03/24 0700 03/25 0659 03/25 0700 03/26 0659 03/26 0700 03/27 0659    I.V. (mL/kg) 2050 (32.5) 1364.7 (21.7)     NG/      IV Piggyback 2789 642.8     Total Intake(mL/kg) 5039 (80) 2007.6 (31.9)     Urine (mL/kg/hr) 850 (0.6) 2825 (1.9)     Drains 1300 350     Other 0      Stool 0      Total Output 2150 3175     Net +2889 -1167.4            Urine Occurrence 1 x      Stool Occurrence 1 x               Physical Exam  Vitals and nursing note reviewed.   Constitutional:        General: He is not in acute distress.     Appearance: He is not ill-appearing.      Comments: O2 via NC  NGT to [facility]   HENT:      Head: Normocephalic and atraumatic.      Nose: Nose normal.      Comments: NGT in place  Eyes:      General: No scleral icterus.  Cardiovascular:      Rate and Rhythm: Normal rate.   Pulmonary:      Effort: Pulmonary effort is normal. No respiratory distress.   Abdominal:      General: There is distension.      Palpations: Abdomen is soft.      Tenderness: There is no abdominal tenderness. There is no guarding or rebound.   Musculoskeletal:         General: No deformity.   Skin:     General: Skin is warm.   Neurological:      General: No focal deficit present.      Mental Status: He is alert.          Significant Labs:  I have reviewed all pertinent lab results within the past 24 hours.  CBC:   Recent Labs   Lab 03/26/24  0608   WBC 6.76   RBC 3.26*   HGB 10.1*   HCT 31.5*      MCV 97   MCH 31.0   MCHC 32.1       CMP:   Recent Labs   Lab 03/26/24  0608   GLU 76   CALCIUM 8.6*   ALBUMIN 2.1*   PROT 4.9*      K 4.1   CO2 26      BUN 6*   CREATININE 0.6   ALKPHOS 128   ALT 24   AST 26   BILITOT 0.4         Significant Diagnostics:  I have reviewed all pertinent imaging results/findings within the past 24 hours.

## 2024-03-26 NOTE — PROGRESS NOTES
[facility]  General Surgery  Progress Note    Subjective:     History of Present Illness:  No notes on file    Post-Op Info:  * No surgery found *         Interval History: Stepped down to [facility] overnight. NGT inadvertently pulled back then advanced. Episode of coughing and desaturation with this. O2 >95% on 4L this AM.     Medications:  Continuous Infusions:   dextrose 5 % and 0.45 % NaCl with KCl 20 mEq 100 mL/hr at 03/25/24 2035     Scheduled Meds:   bisacodyL  10 mg Rectal Daily    clonazePAM  1 mg Per NG tube BID    enoxparin  40 mg Subcutaneous Daily    EScitalopram oxalate  20 mg Per NG tube Daily    lamoTRIgine  100 mg Per NG tube BID    magnesium citrate  296 mL Per NG tube Once    OLANZapine  10 mg Per NG tube Nightly    pantoprazole  40 mg Intravenous BID    polyethylene glycol  17 g Per NG tube Daily    risperiDONE  0.5 mg Per NG tube QHS    valproic acid (as sodium salt)  250 mg Per NG tube Q6H     PRN Meds:acetaminophen, dextrose 10%, dextrose 10%, glucagon (human recombinant), glucose, glucose, ondansetron, prochlorperazine, sodium chloride 0.9%     Review of patient's allergies indicates:  No Known Allergies  Objective:     Vital Signs (Most Recent):  Temp: 97.6 °F (36.4 °C) (03/26/24 0437)  Pulse: 95 (03/26/24 0700)  Resp: 18 (03/25/24 2045)  BP: 125/71 (03/26/24 0437)  SpO2: 98 % (03/26/24 0437) Vital Signs (24h Range):  Temp:  [97.4 °F (36.3 °C)-98.9 °F (37.2 °C)] 97.6 °F (36.4 °C)  Pulse:  [] 95  Resp:  [13-27] 18  SpO2:  [96 %-100 %] 98 %  BP: (125-158)/(60-76) 125/71     Weight: 63 kg (139 lb)  Body mass index is 22.44 kg/m².    Intake/Output - Last 3 Shifts         03/24 0700 03/25 0659 03/25 0700 03/26 0659 03/26 0700 03/27 0659    I.V. (mL/kg) 2050 (32.5) 1364.7 (21.7)     NG/      IV Piggyback 2789 642.8     Total Intake(mL/kg) 5039 (80) 2007.6 (31.9)     Urine (mL/kg/hr) 850 (0.6) 2825 (1.9)     Drains 1300 350     Other 0      Stool 0      Total Output 2150 6065      Net +2889 -1167.4            Urine Occurrence 1 x      Stool Occurrence 1 x              Physical Exam  Vitals and nursing note reviewed.   Constitutional:       General: He is not in acute distress.     Appearance: He is not ill-appearing.      Comments: O2 via NC  NGT to [facility]   HENT:      Head: Normocephalic and atraumatic.      Nose: Nose normal.      Comments: NGT in place  Eyes:      General: No scleral icterus.  Cardiovascular:      Rate and Rhythm: Normal rate.   Pulmonary:      Effort: Pulmonary effort is normal. No respiratory distress.   Abdominal:      General: There is distension.      Palpations: Abdomen is soft.      Tenderness: There is no abdominal tenderness. There is no guarding or rebound.   Musculoskeletal:         General: No deformity.   Skin:     General: Skin is warm.   Neurological:      General: No focal deficit present.      Mental Status: He is alert.          Significant Labs:  I have reviewed all pertinent lab results within the past 24 hours.  CBC:   Recent Labs   Lab 03/26/24  0608   WBC 6.76   RBC 3.26*   HGB 10.1*   HCT 31.5*      MCV 97   MCH 31.0   MCHC 32.1       CMP:   Recent Labs   Lab 03/26/24  0608   GLU 76   CALCIUM 8.6*   ALBUMIN 2.1*   PROT 4.9*      K 4.1   CO2 26      BUN 6*   CREATININE 0.6   ALKPHOS 128   ALT 24   AST 26   BILITOT 0.4         Significant Diagnostics:  I have reviewed all pertinent imaging results/findings within the past 24 hours.  Assessment/Plan:     * Ileus  Patient is a 59 year old male with PMHx including cerebral palsy transferred for concern of ischemic bowel and GI bleed. Patient likely has chronic ileus given neurologic and psych history. Lactate down trending with rehydration. Passing flatus.    - NPO  - NGT to WS  - mIVF with D5 1/2 NS  - Hold xarelto given [facility] concern for GI bleed, H/H stable this AM. 10.1 from 9.6  - PPI  - Give another enema today  - Mag citrate PO  - Bowel regimen (daily suppository, miralax)  -  Daily labs  - Replete lytes   - Home meds    Dispo: not yet medically stable for dc. Lives at home with 24/7 sitters    Case discussed with Dr. Saldivar.      GIANCARLO MathewsC  General Surgery  [facility]

## 2024-03-26 NOTE — NURSING
Portable chest xray and KUB done on patient.  Rapid team at bedside.  Spoke with Dr. Baker on phone, stated he looked at xray and NGT ok to put back to LIWS.  Patient no longer coughing and dyspneic, o2 sats 98% on 4L per NC.  Still unable to get an IV. Midline consult placed.

## 2024-03-26 NOTE — NURSING
Notified by patient's nurse that patient needed an IV and his NGT came out to 15cm.  Advanced NGT to 58cm, KUB ordered.  Patient noted to be coughing and spitting up frothy sputum, suctioned patient with yankeur multiple times, o2 sats 80% on room air, 3L of o2 per NC applied, o2 sats up to 89%, increased o2 to 4L.  Notified rapid team of patient appearing to have trouble breathing w/low o2 sats, also notified Dr. Walker, surgery resident.  Portable chest xray also ordered.

## 2024-03-26 NOTE — ASSESSMENT & PLAN NOTE
Patient is a 59 year old male with PMHx including cerebral palsy transferred for concern of ischemic bowel and GI bleed. Patient likely has chronic ileus given neurologic and psych history. Lactate down trending with rehydration. Passing flatus.    - NPO  - NGT to LIWS  - mIVF with D5 1/2 NS  - Hold xarelto given [facility] concern for GI bleed, H/H stable this AM. 10.1 from 9.6  - PPI  - Give another enema today  - Mag citrate PO  - Bowel regimen (daily suppository, miralax)  - Daily labs  - Replete lytes   - Home meds    Dispo: not yet medically stable for dc. Lives at home with 24/7 sitters

## 2024-03-26 NOTE — CARE UPDATE
RAPID RESPONSE NURSE PROACTIVE ROUNDING NOTE       Time of Visit: 002    Admit Date: 3/24/2024  LOS: 2  Code Status: Full Code   Date of Visit: 2024  : 1965  Age: 59 y.o.  Sex: male  Race: Black or   Bed: 103/103 A:   MRN: [id number]  Was the patient discharged from an ICU this admission? No   Was the patient discharged from a PACU within last 24 hours? No   Did the patient receive conscious sedation/general anesthesia in last 24 hours? No  Was the patient in the ED within the past 24 hours? No  Was the patient on NIPPV within the past 24 hours? No   Attending Physician: Henrry Saldivar MD  Primary Service: General Surgery   Time spent at the bedside: < 15 min    SITUATION    Notified by charge RN via phone call.  Reason for alert: Replacing NGT  Called to evaluate the patient for  Possible aspiration during NGT replacement    BACKGROUND     Why is the patient in the hospital?: Ileus    Patient has a past medical history of Anxiety, Bipolar 1 disorder, BPH (benign prostatic hyperplasia), Cerebral palsy, Depression, Frequent UTI, HTN (hypertension), Hypercholesteremia, Mental retardation, Mood disorder due to medical condition, and Schizophrenia.    Last Vitals:  Temp: 97.4 °F (36.3 °C) (2348)  Pulse: 102 (2348)  Resp: 25 (2000)  BP: 138/73 (2348)  SpO2: 99 % (2348)    24 Hours Vitals Range:  Temp:  [97.4 °F (36.3 °C)-98.9 °F (37.2 °C)]   Pulse:  []   Resp:  [0-29]   BP: ()/(52-76)   SpO2:  [93 %-100 %]     Labs:  Recent Labs     24  0456 24  0417 24  0300   WBC 7.10 15.85* 11.14   HGB 12.6* 12.1* 9.6*   HCT 39.8* 37.8* 29.6*    283 214       Recent Labs     24  0417 24  1703 24  0300 24  2033     --  143 138   K 3.3* 3.8 3.7 3.7     --  109 105   CO2 23  --  26 26   BUN 24*  --  16 10   CREATININE 0.8  --  0.7 0.6   *  --  67* 64*   PHOS 3.8  --  3.0 2.8   MG 1.8  --  1.9  2.3          ASSESSMENT    Physical Exam  Constitutional:       Appearance: He is ill-appearing and diaphoretic.   HENT:      Nose:      Comments: R nare NGT  Cardiovascular:      Rate and Rhythm: Tachycardia present.   Pulmonary:      Breath sounds: Decreased air movement present.      Comments: 3L NC  Skin:     General: Skin is warm.   Neurological:      Mental Status: He is easily aroused. Mental status is at baseline.   Psychiatric:         Mood and Affect: Affect is flat.     Notified by Charge Efren ORTIZ that patient possibly aspirated during NGT replacement requiring frequent suctioning with yankeur from mouth at this time. Upon arrival to bedside patient lying in bed, diaphoretic, but tolerating 3L NC (SpO2 mid 90s). NGT to wall suction with approximately 50cc tan/orange output in canister. X-ray tech present and obtained KUB and CXR. NGT now measuring at 58cm at this time (advanced from 15cm by bedside RN), connected to LIWS per Dr. Baker. During evaluation patient is without respiratory distress or dyspnea on 3L NC. Per Dr. Baker patient to remain in 1034.     INTERVENTIONS    The patient was seen for Respiratory problem. Staff concerns included tachypnea, increased WOB, and increased oxygen requirements. The following interventions were performed: supplemental oxygen, PRN suctioning, portable chest x-ray, continuous pulse ox monitoring continued, continuous cardiac monitoring continued, no additional interventions needed at this time, and KUB, NGT to LIWS.    RECOMMENDATIONS    -Continue LIWS    -Monitor respiratory status    -NGT maintenance     PROVIDER ESCALATION    Yes/No  Yes    Orders received and case discussed with Dr. Baker .    Disposition: Remain in room 1034.    FOLLOW-UP    Charge Efren ORTIZ  updated on plan of care. Instructed to call the Rapid Response Nurse, Zoë Hidalgo RN at [phone] for additional questions or concerns.

## 2024-03-26 NOTE — PLAN OF CARE
Problem: Adult Inpatient Plan of Care  Goal: Plan of Care Review  Outcome: Ongoing, Progressing  Goal: Patient-Specific Goal (Individualized)  Outcome: Ongoing, Progressing  Goal: Absence of Hospital-Acquired Illness or Injury  Outcome: Ongoing, Progressing  Goal: Optimal Comfort and Wellbeing  Outcome: Ongoing, Progressing  Goal: Readiness for Transition of Care  Outcome: Ongoing, Progressing     Problem: Fall Injury Risk  Goal: Absence of Fall and Fall-Related Injury  Outcome: Ongoing, Progressing     Problem: Cognitive Impairment  Goal: Optimal Cognitive Function  Outcome: Ongoing, Progressing     Problem: Fluid Deficit (Intestinal Obstruction)  Goal: Fluid Balance  Outcome: Ongoing, Progressing     Problem: Infection (Intestinal Obstruction)  Goal: Absence of Infection Signs and Symptoms  Outcome: Ongoing, Progressing     Problem: Nausea and Vomiting (Intestinal Obstruction)  Goal: Nausea and Vomiting Relief  Outcome: Ongoing, Progressing     Problem: Pain (Intestinal Obstruction)  Goal: Acceptable Pain Control  Outcome: Ongoing, Progressing     Problem: Impaired Wound Healing  Goal: Optimal Wound Healing  Outcome: Ongoing, Progressing

## 2024-03-26 NOTE — NURSING TRANSFER
Nursing Transfer Note      3/25/2024   9:18 PM    Nurse giving handoff:AYESHA Acevedo, RN  Nurse receiving handoff:Suzanne    Reason patient is being transferred: Stepdown    Transfer To: [place] [id number]    Transfer via bed    Transfer with cardiac monitoring    Transported by AYESHA Acevedo and LAURA Greenberg     Transfer Vital Signs:  VSS  Telemetry: Transferred on Portable monitor/tele applied by [facility] nurse    Order for Tele Monitor? Yes    Additional Lines: Suction     4eyes on Skin: yes    Medicines sent: yes    Patient belongings transferred with patient: Yes    Chart send with patient: Yes    Notified:sisterMeg     Patient reassessed at: 2235 3/25 (date, time)  1  Upon arrival to floor: cardiac monitor applied, patient oriented to room, call bell in reach, and bed in lowest position

## 2024-03-26 NOTE — PLAN OF CARE
[facility] Plan of Care Note      Dx Ileus    Shift Events Placement of NG tube was moved; rapid was called due to change in patient stauts;    Goals of Care: Patient to maintain placement of NG tube;     Neuro: ANOx2    Vital Signs: BP:147/76 TEMP:98 PULSE: 102 RESP:18      Respiratory: 2L    Diet: NPO except meds    Is patient tolerating current diet? YES    GTTS: D51/2 NS@100/hr    Urine Output/Bowel Movement: Urine X1 and 1500cc BM; Extra large     Drains/Tubes/Tube Feeds (include total output/shift): LT=925    Lines: bilateral 20G      Accuchecks: Q4    Skin: Scabbing to the right shoulder, thigh and knee due to previous fall.     Fall Risk Score: 17    Activity level? Completely dependent    Any scheduled procedures? No    Any safety concerns? Fall risk; Aspiration    Other: NG tube frequently moves  position, repeat KUB ordered  Problem: Fall Injury Risk  Goal: Absence of Fall and Fall-Related Injury  Outcome: Ongoing, Progressing     Problem: Cognitive Impairment  Goal: Optimal Cognitive Function  Outcome: Ongoing, Progressing     Problem: Adult Inpatient Plan of Care  Goal: Absence of Hospital-Acquired Illness or Injury  Outcome: Ongoing, Progressing

## 2024-03-27 LAB
ALBUMIN SERPL BCP-MCNC: 2.3 G/DL (ref 3.5–5.2)
ALP SERPL-CCNC: 142 U/L (ref 55–135)
ALT SERPL W/O P-5'-P-CCNC: 29 U/L (ref 10–44)
ANION GAP SERPL CALC-SCNC: 9 MMOL/L (ref 8–16)
AST SERPL-CCNC: 32 U/L (ref 10–40)
BASOPHILS # BLD AUTO: 0.02 K/UL (ref 0–0.2)
BASOPHILS NFR BLD: 0.3 % (ref 0–1.9)
BILIRUB SERPL-MCNC: 0.4 MG/DL (ref 0.1–1)
BUN SERPL-MCNC: 4 MG/DL (ref 8–23)
CALCIUM SERPL-MCNC: 9.2 MG/DL (ref 8.7–10.5)
CHLORIDE SERPL-SCNC: 108 MMOL/L (ref 95–110)
CO2 SERPL-SCNC: 22 MMOL/L (ref 23–29)
CREAT SERPL-MCNC: 0.6 MG/DL (ref 0.5–1.4)
DIFFERENTIAL METHOD BLD: ABNORMAL
EOSINOPHIL # BLD AUTO: 0.1 K/UL (ref 0–0.5)
EOSINOPHIL NFR BLD: 2 % (ref 0–8)
ERYTHROCYTE [DISTWIDTH] IN BLOOD BY AUTOMATED COUNT: 14.4 % (ref 11.5–14.5)
EST. GFR  (NO RACE VARIABLE): >60 ML/MIN/1.73 M^2
GLUCOSE SERPL-MCNC: 72 MG/DL (ref 70–110)
HCT VFR BLD AUTO: 32.5 % (ref 40–54)
HGB BLD-MCNC: 10.9 G/DL (ref 14–18)
IMM GRANULOCYTES # BLD AUTO: 0.13 K/UL (ref 0–0.04)
IMM GRANULOCYTES NFR BLD AUTO: 1.8 % (ref 0–0.5)
LYMPHOCYTES # BLD AUTO: 1.6 K/UL (ref 1–4.8)
LYMPHOCYTES NFR BLD: 22.8 % (ref 18–48)
MAGNESIUM SERPL-MCNC: 2.2 MG/DL (ref 1.6–2.6)
MCH RBC QN AUTO: 31.6 PG (ref 27–31)
MCHC RBC AUTO-ENTMCNC: 33.5 G/DL (ref 32–36)
MCV RBC AUTO: 94 FL (ref 82–98)
MONOCYTES # BLD AUTO: 0.9 K/UL (ref 0.3–1)
MONOCYTES NFR BLD: 12.8 % (ref 4–15)
NEUTROPHILS # BLD AUTO: 4.3 K/UL (ref 1.8–7.7)
NEUTROPHILS NFR BLD: 60.3 % (ref 38–73)
NRBC BLD-RTO: 0 /100 WBC
PHOSPHATE SERPL-MCNC: 3.4 MG/DL (ref 2.7–4.5)
PLATELET # BLD AUTO: 183 K/UL (ref 150–450)
PMV BLD AUTO: 11.9 FL (ref 9.2–12.9)
POCT GLUCOSE: 77 MG/DL (ref 70–110)
POCT GLUCOSE: 82 MG/DL (ref 70–110)
POCT GLUCOSE: 84 MG/DL (ref 70–110)
POCT GLUCOSE: 86 MG/DL (ref 70–110)
POCT GLUCOSE: 87 MG/DL (ref 70–110)
POCT GLUCOSE: 89 MG/DL (ref 70–110)
POTASSIUM SERPL-SCNC: 4.7 MMOL/L (ref 3.5–5.1)
PROT SERPL-MCNC: 6.2 G/DL (ref 6–8.4)
RBC # BLD AUTO: 3.45 M/UL (ref 4.6–6.2)
SODIUM SERPL-SCNC: 139 MMOL/L (ref 136–145)
WBC # BLD AUTO: 7.09 K/UL (ref 3.9–12.7)

## 2024-03-27 PROCEDURE — 99232 SBSQ HOSP IP/OBS MODERATE 35: CPT | Mod: ,,, | Performed by: STUDENT IN AN ORGANIZED HEALTH CARE EDUCATION/TRAINING PROGRAM

## 2024-03-27 PROCEDURE — 85025 COMPLETE CBC W/AUTO DIFF WBC: CPT

## 2024-03-27 PROCEDURE — 25000003 PHARM REV CODE 250

## 2024-03-27 PROCEDURE — 83735 ASSAY OF MAGNESIUM: CPT

## 2024-03-27 PROCEDURE — 84100 ASSAY OF PHOSPHORUS: CPT

## 2024-03-27 PROCEDURE — 80053 COMPREHEN METABOLIC PANEL: CPT

## 2024-03-27 PROCEDURE — 20600001 HC STEP DOWN PRIVATE ROOM

## 2024-03-27 PROCEDURE — 63600175 PHARM REV CODE 636 W HCPCS

## 2024-03-27 PROCEDURE — 36415 COLL VENOUS BLD VENIPUNCTURE: CPT

## 2024-03-27 PROCEDURE — C9113 INJ PANTOPRAZOLE SODIUM, VIA: HCPCS

## 2024-03-27 PROCEDURE — 92610 EVALUATE SWALLOWING FUNCTION: CPT

## 2024-03-27 RX ADMIN — LAMOTRIGINE 100 MG: 100 TABLET ORAL at 08:03

## 2024-03-27 RX ADMIN — CLONAZEPAM 1 MG: 0.25 TABLET, ORALLY DISINTEGRATING ORAL at 09:03

## 2024-03-27 RX ADMIN — VALPROIC ACID 250 MG: 250 SOLUTION ORAL at 12:03

## 2024-03-27 RX ADMIN — OLANZAPINE 10 MG: 5 TABLET, FILM COATED ORAL at 09:03

## 2024-03-27 RX ADMIN — VALPROIC ACID 250 MG: 250 SOLUTION ORAL at 06:03

## 2024-03-27 RX ADMIN — LAMOTRIGINE 100 MG: 100 TABLET ORAL at 09:03

## 2024-03-27 RX ADMIN — BISACODYL 10 MG: 10 SUPPOSITORY RECTAL at 08:03

## 2024-03-27 RX ADMIN — PANTOPRAZOLE SODIUM 40 MG: 40 INJECTION, POWDER, FOR SOLUTION INTRAVENOUS at 08:03

## 2024-03-27 RX ADMIN — ESCITALOPRAM OXALATE 20 MG: 5 TABLET, FILM COATED ORAL at 08:03

## 2024-03-27 RX ADMIN — ENOXAPARIN SODIUM 40 MG: 40 INJECTION SUBCUTANEOUS at 06:03

## 2024-03-27 RX ADMIN — POLYETHYLENE GLYCOL 3350 17 G: 17 POWDER, FOR SOLUTION ORAL at 08:03

## 2024-03-27 RX ADMIN — VALPROIC ACID 250 MG: 250 SOLUTION ORAL at 05:03

## 2024-03-27 RX ADMIN — RISPERIDONE 0.5 MG: 0.25 TABLET, FILM COATED ORAL at 09:03

## 2024-03-27 RX ADMIN — CLONAZEPAM 1 MG: 0.25 TABLET, ORALLY DISINTEGRATING ORAL at 08:03

## 2024-03-27 NOTE — PT/OT/SLP EVAL
Speech Language Pathology Evaluation  Bedside Swallow    Patient Name:  Edinson Umanzor   MRN:  [id number]  Admitting Diagnosis: Ileus    Recommendations:                 General Recommendations:   ST to continue to follow up for ongoing swallow assessment   Diet recommendations:  NPO, ongoing swallow assessment warranted pending alertness and PO acceptance. Other (provided MD clearance and strict aspiration precautions, occasional single (teaspoon sized) sip nectar-thickened liquid for comfort ok sparingly.)  Aspiration Precautions: Frequent oral care and Strict aspiration precautions , only when awake/alert/attentive, only when vital signs stable, upright, one-to-one assistance, close monitoring for S/S aspiration. Continue to monitor for signs and symptoms of aspiration and discontinue oral feeding should you notice any of the following: watery eyes, reddened facial area, wet vocal quality, increased work of breathing, change in respiratory status, increased congestion, coughing, fever and/or change in level of alertness  General Precautions: Standard, aspiration, fall  Communication strategies:  provide increased time to answer and go to room if call light pushed    Assessment:     Edinson Umanzor is a 59 y.o. male with an SLP diagnosis of Dysphagia.  He presents with lethargy and minimal PO acceptance.  He would benefit from ongoing assessment to determine safest, least restrictive means nutrition/hydration.     History:     Past Medical History:   Diagnosis Date    Anxiety     Bipolar 1 disorder     BPH (benign prostatic hyperplasia)     Cerebral palsy     Depression     Frequent UTI     HTN (hypertension)     Hypercholesteremia     Mental retardation     Mood disorder due to medical condition     Schizophrenia        Past Surgical History:   Procedure Laterality Date    BOWEL RESECTION      COLONOSCOPY N/A 3/12/2018    Procedure: COLONOSCOPY;  Surgeon: Chip Villanueva MD;  Location: [facility];   Service: Endoscopy;  Laterality: N/A;       Social History/occupation: Per chart, Patient lives alone in an apartment with 24/7 caregivers    Prior Intubation HX:  none this admission     Modified Barium Swallow: none prior at this facility     Chest X-Rays: 3/26/24: Single chest view is submitted.  Enteric tube is noted, the distal tip extends subdiaphragmatic overlying the left upper quadrant of the abdomen.  There is diminished depth of inspiration, there is mild elevation of the left hemidiaphragm appearing similar to the prior study.  There is minimal rotation, when accounting for position and technique and depth of inspiration, the appearance of the cardiomediastinal silhouette is stable.     Accentuation of pulmonary bronchovascular markings consistent with diminished depth of inspiration noted, mild atelectatic change noted.     There is no evidence for confluent infiltrate or consolidation, significant pleural effusion or pneumothorax.     The visualized osseous structures appear intact.    Prior diet: Unknown prior to admit. Pt with order for clear liquid/nectar-thickened liquids and medications crushed in puree at time of this assessment.       Subjective     SLP Reviewed pt with RN, RN cleared for tx, explained Pt pulled NG night prior and tolerating medications with nectar-thickened liquids  Pt presents lethargic  Pt moved head away from PO presentations as assessment progressed    Pain/Comfort:  Pain Rating 1: other (see comments) (did not rate)  Pain Addressed 1: Nurse notified, Cessation of Activity, Reposition  Pain Rating Post-Intervention 1: other (see comments) (did not rate)    Respiratory Status: Room air    Objective:     Oral Musculature Evaluation  Oral Musculature: unable to assess due to poor participation/comprehension  Dentition: edentulous  Secretion Management: adequate  Mucosal Quality: adequate  Mandibular Strength and Mobility: other (see comments) (unable to assess 2/2 decreased  command following/underlying cognitive status)  Oral Labial Strength and Mobility: other (see comments) (unable to assess 2/2 decreased command following/underlying cognitive status)  Lingual Strength and Mobility: other (see comments) (unable to assess 2/2 decreased command following/underlying cognitive status)  Volitional Cough: unable to elicit on command  Volitional Swallow: elicited, delaye and inconsistent timing of initiation  Voice Prior to PO Intake: mildly strained with low intensity    Bedside Swallow Eval:   Consistencies Assessed:  Nectar thick liquids : tsp sips x2  Puree 1/2 tsp bites x2      Oral Phase:   Decreased closure around utensil  Oral holding  Oral residue    Pharyngeal Phase:   decreased hyolaryngeal excursion to palpation  delayed swallow initation  multiple spontaneous swallows    Compensatory Strategies  Limited due to lethargy and underlying cognitive status    Treatment: Pt found asleep in bed with RN at the bedside. Pt woke per moderate verbal cues. RN assisted in repositioning Pt in bed and elevated HOB. SLP noted cups of thin and nectar-thickened liquids on bedside table. RN explained accepted medications with nectar-thickened liquids via straw earlier service day without difficulty. RN exited room as SLP initiated assessment. Pt with persistent lethargy and turned head away from SLP as assessment continued.  RN notified and returned to room to review Pt. Pt with more prolonged oral holding as trials progressed. RN confirmed Pt more alert earlier service day. Additional PO trials deferred due to oral holding, lethargy and aspiration risk. Pt was educated on SLP Role and need for ongoing assessment. Pt did not demonstrate understanding. No family present. Pt remained in bed with call light in reach, RN in doorway, upon SLP exit.     Goals:   Multidisciplinary Problems       SLP Goals          Problem: SLP    Goal Priority Disciplines Outcome   SLP Goal     SLP Ongoing, Progressing    Description: Speech Language Pathology Goals  Goals expected to be met by 4/4/24    1. Pt will participate in ongoing assessment of swallow function to determine safest, least restrictive means of nutrition/hydration  2. Educate Pt and family on aspiration precautions and SLP POC                           Plan:     Patient to be seen:  4 x/week   Plan of Care expires:  04/26/24  Plan of Care reviewed with:  patient   SLP Follow-Up:  Yes       Discharge recommendations:  Moderate Intensity Therapy   Barriers to Discharge: ongoing swallow assessment     Time Tracking:     SLP Treatment Date:   03/27/24  Speech Start Time:  1034  Speech Stop Time:  1054     Speech Total Time (min):  20 min    Billable Minutes: Eval Swallow and Oral Function 18    03/27/2024

## 2024-03-27 NOTE — ASSESSMENT & PLAN NOTE
Patient is a 59 year old male with PMHx including cerebral palsy transferred for concern of ischemic bowel and GI bleed. Patient likely has chronic ileus given neurologic and psych history. Lactate down trending with rehydration. Passing flatus, having BM    - NPO  - SLP eval   - CPT   - mIVF with D5 1/2 NS  - Hold xarelto given OSF concern for GI bleed. Will restart pending SLP eval, dietary progression  - PPI  - Bowel regimen (daily suppository, miralax)  - Daily labs  - Replete lytes   - Home meds    Dispo: not yet medically stable for dc. Lives at home with 24/7 sitters

## 2024-03-27 NOTE — SUBJECTIVE & OBJECTIVE
Interval History: NAEON. Afebrile. HDS. Desaturation with cough overnight, resolves spontaneously. Currently 96% on RA. BM yesterday. No n/v. WBC stable and wnl    Medications:  Continuous Infusions:   dextrose 5 % and 0.45 % NaCl with KCl 20 mEq 100 mL/hr at 03/26/24 2311     Scheduled Meds:   bisacodyL  10 mg Rectal Daily    clonazePAM  1 mg Per NG tube BID    enoxparin  40 mg Subcutaneous Daily    EScitalopram oxalate  20 mg Per NG tube Daily    lamoTRIgine  100 mg Per NG tube BID    OLANZapine  10 mg Per NG tube Nightly    pantoprazole  40 mg Intravenous BID    polyethylene glycol  17 g Per NG tube Daily    risperiDONE  0.5 mg Per NG tube QHS    valproic acid (as sodium salt)  250 mg Per NG tube Q6H     PRN Meds:acetaminophen, dextrose 10%, dextrose 10%, glucagon (human recombinant), glucose, glucose, ondansetron, prochlorperazine, sodium chloride 0.9%     Review of patient's allergies indicates:  No Known Allergies  Objective:     Vital Signs (Most Recent):  Temp: 97.7 °F (36.5 °C) (03/27/24 0607)  Pulse: 95 (03/27/24 0607)  Resp: 18 (03/27/24 0607)  BP: (!) 163/80 (03/27/24 0607)  SpO2: 96 % (03/27/24 0607) Vital Signs (24h Range):  Temp:  [97.7 °F (36.5 °C)-98.2 °F (36.8 °C)] 97.7 °F (36.5 °C)  Pulse:  [] 95  Resp:  [14-18] 18  SpO2:  [86 %-100 %] 96 %  BP: (123-163)/(72-80) 163/80     Weight: 63 kg (139 lb)  Body mass index is 22.44 kg/m².    Intake/Output - Last 3 Shifts         03/25 0700 03/26 0659 03/26 0700 03/27 0659 03/27 0700 03/28 0659    I.V. (mL/kg) 1364.7 (21.7) 1325.2 (21)     NG/GT 50      IV Piggyback 642.8 21.1     Total Intake(mL/kg) 2057.6 (32.7) 1346.3 (21.4)     Urine (mL/kg/hr) 2825 (1.9)      Drains 350      Other       Stool 0      Total Output 3175      Net -1117.4 +1346.3            Urine Occurrence  2 x     Stool Occurrence 1 x 1 x              Physical Exam  Vitals and nursing note reviewed.   Constitutional:       General: He is not in acute distress.     Appearance: He  is not ill-appearing.      Comments: JORDON LEAL:      Head: Normocephalic and atraumatic.      Nose: Nose normal.   Eyes:      General: No scleral icterus.  Cardiovascular:      Rate and Rhythm: Normal rate.   Pulmonary:      Effort: Pulmonary effort is normal. No respiratory distress.   Abdominal:      General: There is distension.      Palpations: Abdomen is soft.      Tenderness: There is no abdominal tenderness. There is no guarding or rebound.   Musculoskeletal:         General: No deformity.   Skin:     General: Skin is warm.   Neurological:      General: No focal deficit present.      Mental Status: He is alert.          Significant Labs:  I have reviewed all pertinent lab results within the past 24 hours.  CBC:   Recent Labs   Lab 03/27/24  0444   WBC 7.09   RBC 3.45*   HGB 10.9*   HCT 32.5*      MCV 94   MCH 31.6*   MCHC 33.5       CMP:   Recent Labs   Lab 03/27/24  0445   GLU 72   CALCIUM 9.2   ALBUMIN 2.3*   PROT 6.2      K 4.7   CO2 22*      BUN 4*   CREATININE 0.6   ALKPHOS 142*   ALT 29   AST 32   BILITOT 0.4         Significant Diagnostics:  I have reviewed all pertinent imaging results/findings within the past 24 hours.

## 2024-03-27 NOTE — PLAN OF CARE
[facility]  Discharge Reassessment    Primary Care Provider: Krishan Massey II, NP    Expected Discharge Date: 4/1/2024    Reassessment (most recent)       Discharge Reassessment - 03/27/24 1502          Discharge Reassessment    Assessment Type Discharge Planning Reassessment     Did the patient's condition or plan change since previous assessment? No     Discharge Plan discussed with: Patient     Communicated SANDY with patient/caregiver Yes     Discharge Plan A Home     Discharge Plan B Home     Transition of Care Barriers None     Why the patient remains in the hospital Requires continued medical care                     Possible dc Friday.     Patient has 24 hr sitters at home.

## 2024-03-27 NOTE — NURSING
Patient having coughing spasm, cough sounds like secretions are in throat and not clearing.  Oral suctioning performed, patient noted to have a lot of mucous in nares, suctioned nares as well. O2 sats 83% while coughing, refuses to wear oxygen.  Rapid nurse called to check on patient as well as surgery resident.  Patient able to tolerate crushed meds with applesauce but has coughing spells with liquids.  Once patient stopped coughing, o2 sats back up to 96% on room air.

## 2024-03-27 NOTE — PLAN OF CARE
Problem: SLP  Goal: SLP Goal  Description: Speech Language Pathology Goals  Goals expected to be met by 4/4/24    1. Pt will participate in ongoing assessment of swallow function to determine safest, least restrictive means of nutrition/hydration  2. Educate Pt and family on aspiration precautions and SLP POC      Outcome: Ongoing, Progressing     SLP Bedside Swallow Evaluation initiated. Patient with minimal PO acceptance and lethargy upon SLP attempt. RN aware. Pt would benefit from ongoing swallow assessment to determine safest, least restrictive means nutrition/hydration as feasible.     3/27/2024

## 2024-03-27 NOTE — CARE UPDATE
RAPID RESPONSE NURSE PROACTIVE ROUNDING NOTE       Time of Visit:     Admit Date: 3/24/2024  LOS: 3  Code Status: Full Code   Date of Visit: 2024  : 1965  Age: 59 y.o.  Sex: male  Race: Black or   Bed: 103103 A:   MRN: [id number]  Was the patient discharged from an ICU this admission? No   Was the patient discharged from a PACU within last 24 hours? No   Did the patient receive conscious sedation/general anesthesia in last 24 hours? No  Was the patient in the ED within the past 24 hours? No  Was the patient on NIPPV within the past 24 hours? No   Attending Physician: Henrry Saldivar MD  Primary Service: General Surgery   Time spent at the bedside: 15 -30 min    SITUATION    Notified by charge RN via phone call.  Reason for alert: oxygen desaturation   Called to evaluate the patient for Respiratory    BACKGROUND     Why is the patient in the hospital?: Ileus    Patient has a past medical history of Anxiety, Bipolar 1 disorder, BPH (benign prostatic hyperplasia), Cerebral palsy, Depression, Frequent UTI, HTN (hypertension), Hypercholesteremia, Mental retardation, Mood disorder due to medical condition, and Schizophrenia.    Last Vitals:  Temp: 98.2 °F (36.8 °C) (2009)  Pulse: 106 ( 2315)  Resp: 14 (2009)  BP: 134/72 (2009)  SpO2: 95 % (2309)    24 Hours Vitals Range:  Temp:  [97.6 °F (36.4 °C)-98.2 °F (36.8 °C)]   Pulse:  []   Resp:  [14-18]   BP: (123-137)/(64-79)   SpO2:  [86 %-100 %]     Labs:  Recent Labs     24  0417 24  0300 24  0608   WBC 15.85* 11.14 6.76   HGB 12.1* 9.6* 10.1*   HCT 37.8* 29.6* 31.5*    214 172       Recent Labs     24  0300 24  0608    138 139   K 3.7 3.7 4.1    105 108   CO2 26 26 26   BUN 16 10 6*   CREATININE 0.7 0.6 0.6   GLU 67* 64* 76   PHOS 3.0 2.8 3.2   MG 1.9 2.3 2.2        ASSESSMENT    Physical Exam  Constitutional:       Appearance: He is  "ill-appearing.   Cardiovascular:      Rate and Rhythm: Tachycardia present.   Pulmonary:      Effort: Pulmonary effort is normal.   Skin:     General: Skin is warm.   Neurological:      Mental Status: He is alert. Mental status is at baseline.     Called to bedside by Charge Efren ORTIZ for desaturation episode "similar to last night" per report. Upon arrival to bedside, patient sitting upright, alert, at neuro baseline. VSS, SpO2 92% on room air.   Pt has been refusing to maintain nasal cannula and pulse oximetry in place, becoming increasingly agitated with attempts of replacing medical devices.   On room air, pts SpO2 sustains 92-95%. Pt does have one coughing episode while Rapid Response and MD are present at bedside, sats dropped to 83% and patient quickly recovered to 93% without intervention.   Audible secretions not cleared by patient's cough observed, RT contacted for PRN NT suction. Dr. Guevara at bedside during evaluation and order received to replace NGT for medication administration. Bedside RN to attempt scheduled clonazepam administration prior to NGT replacement. Per Dr. GINA Guevara patient remains stable for 1034.     INTERVENTIONS    The patient was seen for Respiratory problem. Staff concerns included oxygen saturation < 90% despite supplemental oxygen, increased WOB, and increased oxygen requirements. The following interventions were performed: supplemental oxygen, PRN suctioning, continuous pulse ox monitoring continued, continuous cardiac monitoring continued, and NT suctioning.    RECOMMENDATIONS    -Continuous telemetry and pulse oximetry monitoring    -maintain IV access    -Strict Aspiration precautions    -NPO until SLP evaluation     -Maintain suction setup    PROVIDER ESCALATION    Yes/No  Yes    Orders received and case discussed with Dr. Guevara .    Disposition: Remain in room 1034.    FOLLOW-UP    charge Efren ORTIZ  updated on plan of care. Instructed to call the Rapid Response " Nurse, Zoë Hidalgo RN at [phone] for additional questions or concerns.

## 2024-03-27 NOTE — PROGRESS NOTES
Ty eliseo [facility]  General Surgery  Progress Note    Subjective:     History of Present Illness:  No notes on file    Post-Op Info:  * No surgery found *         Interval History: NAEON. Afebrile. HDS. Desaturation with cough overnight, resolves spontaneously. Currently 96% on RA. BM yesterday. No n/v. WBC stable and wnl    Medications:  Continuous Infusions:   dextrose 5 % and 0.45 % NaCl with KCl 20 mEq 100 mL/hr at 03/26/24 2311     Scheduled Meds:   bisacodyL  10 mg Rectal Daily    clonazePAM  1 mg Per NG tube BID    enoxparin  40 mg Subcutaneous Daily    EScitalopram oxalate  20 mg Per NG tube Daily    lamoTRIgine  100 mg Per NG tube BID    OLANZapine  10 mg Per NG tube Nightly    pantoprazole  40 mg Intravenous BID    polyethylene glycol  17 g Per NG tube Daily    risperiDONE  0.5 mg Per NG tube QHS    valproic acid (as sodium salt)  250 mg Per NG tube Q6H     PRN Meds:acetaminophen, dextrose 10%, dextrose 10%, glucagon (human recombinant), glucose, glucose, ondansetron, prochlorperazine, sodium chloride 0.9%     Review of patient's allergies indicates:  No Known Allergies  Objective:     Vital Signs (Most Recent):  Temp: 97.7 °F (36.5 °C) (03/27/24 0607)  Pulse: 95 (03/27/24 0607)  Resp: 18 (03/27/24 0607)  BP: (!) 163/80 (03/27/24 0607)  SpO2: 96 % (03/27/24 0607) Vital Signs (24h Range):  Temp:  [97.7 °F (36.5 °C)-98.2 °F (36.8 °C)] 97.7 °F (36.5 °C)  Pulse:  [] 95  Resp:  [14-18] 18  SpO2:  [86 %-100 %] 96 %  BP: (123-163)/(72-80) 163/80     Weight: 63 kg (139 lb)  Body mass index is 22.44 kg/m².    Intake/Output - Last 3 Shifts         03/25 0700 03/26 0659 03/26 0700 03/27 0659 03/27 0700 03/28 0659    I.V. (mL/kg) 1364.7 (21.7) 1325.2 (21)     NG/GT 50      IV Piggyback 642.8 21.1     Total Intake(mL/kg) 2057.6 (32.7) 1346.3 (21.4)     Urine (mL/kg/hr) 2825 (1.9)      Drains 350      Other       Stool 0      Total Output 3175      Net -1117.4 +1346.3            Urine Occurrence  2 x     Stool  Occurrence 1 x 1 x             Physical Exam  Vitals and nursing note reviewed.   Constitutional:       General: He is not in acute distress.     Appearance: He is not ill-appearing.      Comments: JORDON LEAL:      Head: Normocephalic and atraumatic.      Nose: Nose normal.   Eyes:      General: No scleral icterus.  Cardiovascular:      Rate and Rhythm: Normal rate.   Pulmonary:      Effort: Pulmonary effort is normal. No respiratory distress.   Abdominal:      General: There is distension.      Palpations: Abdomen is soft.      Tenderness: There is no abdominal tenderness. There is no guarding or rebound.   Musculoskeletal:         General: No deformity.   Skin:     General: Skin is warm.   Neurological:      General: No focal deficit present.      Mental Status: He is alert.          Significant Labs:  I have reviewed all pertinent lab results within the past 24 hours.  CBC:   Recent Labs   Lab 03/27/24  0444   WBC 7.09   RBC 3.45*   HGB 10.9*   HCT 32.5*      MCV 94   MCH 31.6*   MCHC 33.5       CMP:   Recent Labs   Lab 03/27/24  0445   GLU 72   CALCIUM 9.2   ALBUMIN 2.3*   PROT 6.2      K 4.7   CO2 22*      BUN 4*   CREATININE 0.6   ALKPHOS 142*   ALT 29   AST 32   BILITOT 0.4         Significant Diagnostics:  I have reviewed all pertinent imaging results/findings within the past 24 hours.  Assessment/Plan:     * Ileus  Patient is a 59 year old male with PMHx including cerebral palsy transferred for concern of ischemic bowel and GI bleed. Patient likely has chronic ileus given neurologic and psych history. Lactate down trending with rehydration. Passing flatus, having BM    - NPO  - SLP eval   - CPT   - mIVF with D5 1/2 NS  - Hold xarelto given [facility] concern for GI bleed. Will restart pending SLP eval, dietary progression  - PPI  - Bowel regimen (daily suppository, miralax)  - Daily labs  - Replete lytes   - Home meds    Dispo: not yet medically stable for dc. Lives at home with 24/7  maribell    Case discussed with Dr. Saldivar.      Ajay Rawls PA-C  General Surgery  [facility]

## 2024-03-28 LAB
ALBUMIN SERPL BCP-MCNC: 2.6 G/DL (ref 3.5–5.2)
ALP SERPL-CCNC: 168 U/L (ref 55–135)
ALT SERPL W/O P-5'-P-CCNC: 36 U/L (ref 10–44)
ANION GAP SERPL CALC-SCNC: 9 MMOL/L (ref 8–16)
AST SERPL-CCNC: 36 U/L (ref 10–40)
BASOPHILS # BLD AUTO: 0.02 K/UL (ref 0–0.2)
BASOPHILS NFR BLD: 0.3 % (ref 0–1.9)
BILIRUB SERPL-MCNC: 0.3 MG/DL (ref 0.1–1)
BUN SERPL-MCNC: 5 MG/DL (ref 8–23)
CALCIUM SERPL-MCNC: 9.6 MG/DL (ref 8.7–10.5)
CHLORIDE SERPL-SCNC: 106 MMOL/L (ref 95–110)
CO2 SERPL-SCNC: 24 MMOL/L (ref 23–29)
CREAT SERPL-MCNC: 0.7 MG/DL (ref 0.5–1.4)
DIFFERENTIAL METHOD BLD: ABNORMAL
EOSINOPHIL # BLD AUTO: 0.2 K/UL (ref 0–0.5)
EOSINOPHIL NFR BLD: 2.4 % (ref 0–8)
ERYTHROCYTE [DISTWIDTH] IN BLOOD BY AUTOMATED COUNT: 14.3 % (ref 11.5–14.5)
EST. GFR  (NO RACE VARIABLE): >60 ML/MIN/1.73 M^2
GLUCOSE SERPL-MCNC: 86 MG/DL (ref 70–110)
HCT VFR BLD AUTO: 39.1 % (ref 40–54)
HGB BLD-MCNC: 12.5 G/DL (ref 14–18)
IMM GRANULOCYTES # BLD AUTO: 0.06 K/UL (ref 0–0.04)
IMM GRANULOCYTES NFR BLD AUTO: 0.9 % (ref 0–0.5)
LYMPHOCYTES # BLD AUTO: 1.9 K/UL (ref 1–4.8)
LYMPHOCYTES NFR BLD: 28.6 % (ref 18–48)
MAGNESIUM SERPL-MCNC: 2.3 MG/DL (ref 1.6–2.6)
MCH RBC QN AUTO: 30.5 PG (ref 27–31)
MCHC RBC AUTO-ENTMCNC: 32 G/DL (ref 32–36)
MCV RBC AUTO: 95 FL (ref 82–98)
MONOCYTES # BLD AUTO: 0.7 K/UL (ref 0.3–1)
MONOCYTES NFR BLD: 10.9 % (ref 4–15)
NEUTROPHILS # BLD AUTO: 3.7 K/UL (ref 1.8–7.7)
NEUTROPHILS NFR BLD: 56.9 % (ref 38–73)
NRBC BLD-RTO: 0 /100 WBC
PHOSPHATE SERPL-MCNC: 3.7 MG/DL (ref 2.7–4.5)
PLATELET # BLD AUTO: 202 K/UL (ref 150–450)
PMV BLD AUTO: 11.4 FL (ref 9.2–12.9)
POCT GLUCOSE: 114 MG/DL (ref 70–110)
POCT GLUCOSE: 122 MG/DL (ref 70–110)
POCT GLUCOSE: 126 MG/DL (ref 70–110)
POCT GLUCOSE: 169 MG/DL (ref 70–110)
POCT GLUCOSE: 79 MG/DL (ref 70–110)
POCT GLUCOSE: 89 MG/DL (ref 70–110)
POCT GLUCOSE: 97 MG/DL (ref 70–110)
POTASSIUM SERPL-SCNC: 4.8 MMOL/L (ref 3.5–5.1)
PROT SERPL-MCNC: 6.5 G/DL (ref 6–8.4)
RBC # BLD AUTO: 4.1 M/UL (ref 4.6–6.2)
SODIUM SERPL-SCNC: 139 MMOL/L (ref 136–145)
WBC # BLD AUTO: 6.58 K/UL (ref 3.9–12.7)

## 2024-03-28 PROCEDURE — 84100 ASSAY OF PHOSPHORUS: CPT

## 2024-03-28 PROCEDURE — 36415 COLL VENOUS BLD VENIPUNCTURE: CPT

## 2024-03-28 PROCEDURE — 85025 COMPLETE CBC W/AUTO DIFF WBC: CPT

## 2024-03-28 PROCEDURE — 63600175 PHARM REV CODE 636 W HCPCS

## 2024-03-28 PROCEDURE — 20600001 HC STEP DOWN PRIVATE ROOM

## 2024-03-28 PROCEDURE — 99232 SBSQ HOSP IP/OBS MODERATE 35: CPT | Mod: ,,, | Performed by: STUDENT IN AN ORGANIZED HEALTH CARE EDUCATION/TRAINING PROGRAM

## 2024-03-28 PROCEDURE — 25000003 PHARM REV CODE 250

## 2024-03-28 PROCEDURE — C9113 INJ PANTOPRAZOLE SODIUM, VIA: HCPCS

## 2024-03-28 PROCEDURE — S5010 5% DEXTROSE AND 0.45% SALINE: HCPCS | Performed by: STUDENT IN AN ORGANIZED HEALTH CARE EDUCATION/TRAINING PROGRAM

## 2024-03-28 PROCEDURE — 92526 ORAL FUNCTION THERAPY: CPT

## 2024-03-28 PROCEDURE — 83735 ASSAY OF MAGNESIUM: CPT

## 2024-03-28 PROCEDURE — 25000003 PHARM REV CODE 250: Performed by: STUDENT IN AN ORGANIZED HEALTH CARE EDUCATION/TRAINING PROGRAM

## 2024-03-28 PROCEDURE — 80053 COMPREHEN METABOLIC PANEL: CPT

## 2024-03-28 PROCEDURE — 94761 N-INVAS EAR/PLS OXIMETRY MLT: CPT

## 2024-03-28 RX ORDER — DEXTROSE MONOHYDRATE AND SODIUM CHLORIDE 5; .45 G/100ML; G/100ML
INJECTION, SOLUTION INTRAVENOUS CONTINUOUS
Status: DISCONTINUED | OUTPATIENT
Start: 2024-03-28 | End: 2024-03-29 | Stop reason: HOSPADM

## 2024-03-28 RX ADMIN — VALPROIC ACID 250 MG: 250 SOLUTION ORAL at 05:03

## 2024-03-28 RX ADMIN — CLONAZEPAM 1 MG: 0.25 TABLET, ORALLY DISINTEGRATING ORAL at 09:03

## 2024-03-28 RX ADMIN — LAMOTRIGINE 100 MG: 100 TABLET ORAL at 09:03

## 2024-03-28 RX ADMIN — BISACODYL 10 MG: 10 SUPPOSITORY RECTAL at 09:03

## 2024-03-28 RX ADMIN — PANTOPRAZOLE SODIUM 40 MG: 40 INJECTION, POWDER, FOR SOLUTION INTRAVENOUS at 11:03

## 2024-03-28 RX ADMIN — VALPROIC ACID 250 MG: 250 SOLUTION ORAL at 01:03

## 2024-03-28 RX ADMIN — ENOXAPARIN SODIUM 40 MG: 40 INJECTION SUBCUTANEOUS at 05:03

## 2024-03-28 RX ADMIN — ONDANSETRON 4 MG: 2 INJECTION INTRAMUSCULAR; INTRAVENOUS at 07:03

## 2024-03-28 RX ADMIN — POTASSIUM CHLORIDE, DEXTROSE MONOHYDRATE AND SODIUM CHLORIDE: 150; 5; 450 INJECTION, SOLUTION INTRAVENOUS at 01:03

## 2024-03-28 RX ADMIN — POLYETHYLENE GLYCOL 3350 17 G: 17 POWDER, FOR SOLUTION ORAL at 09:03

## 2024-03-28 RX ADMIN — DEXTROSE AND SODIUM CHLORIDE: 5; 450 INJECTION, SOLUTION INTRAVENOUS at 11:03

## 2024-03-28 RX ADMIN — VALPROIC ACID 250 MG: 250 SOLUTION ORAL at 12:03

## 2024-03-28 RX ADMIN — ESCITALOPRAM OXALATE 20 MG: 5 TABLET, FILM COATED ORAL at 09:03

## 2024-03-28 RX ADMIN — DEXTROSE AND SODIUM CHLORIDE: 5; 450 INJECTION, SOLUTION INTRAVENOUS at 10:03

## 2024-03-28 NOTE — PLAN OF CARE
[facility] Plan of Care Note        Dx Ileus     Shift Events new iv placed l and rt AC patient coughing notified md patient has wet lung sounds suctioned patient oral hob 90 degrees     Goals of Care: Patient to maintain placement of iv sites     Neuro: ANOx2     Vital Signs:stable     Respiratory: RA     Diet: clear nectar thick liquids     Is patient tolerating current diet? YES     GTTS: D51/2 NS 20k@100/hr     Urine Output/Bowel Movement: Urine 3/BM     Drains/Tubes/Tube Feeds (include total output/shift)     Lines: bilateral 18G        Accuchecks: Q4     Skin: Scabbing to the right shoulder, thigh and knee due to previous fall.     Fall Risk Score: 17     Activity level? Completely dependent     Any scheduled procedures? No     Any safety concerns? Fall risk; Aspirat

## 2024-03-28 NOTE — PLAN OF CARE
Problem: SLP  Goal: SLP Goal  Description: Speech Language Pathology Goals  Goals expected to be met by 4/4/24    1. Pt will participate in ongoing assessment of swallow function to determine safest, least restrictive means of nutrition/hydration  2. Educate Pt and family on aspiration precautions and SLP POC      Outcome: Ongoing, Progressing     Pt seen for ongoing swallow assessment.  Full report to follow. REC: when cleared by MD to resume PO, cautiously resume Level IV pureed textures, mildly thickened nectar-thicken liquids, medications crushed in puree, provided strict 1:! Assistance, vital signs stable, Pt is awake/alert/attentive and accepting of PO, single bites/sips, remain upright at least 30 minutes following all PO and strict aspiration precautions. Continue to monitor for signs and symptoms of aspiration and discontinue oral feeding should you notice any of the following: watery eyes, reddened facial area, wet vocal quality, increased work of breathing, change in respiratory status, increased congestion, coughing, fever and/or change in level of alertness. ST to continue to follow. Findings/recs reviewed with RN and MD team.     3/28/2024        3/28/2024

## 2024-03-28 NOTE — PROGRESS NOTES
[facility] Sarm[facility]  General Surgery  Progress Note    Subjective:     History of Present Illness:  No notes on file    Post-Op Info:  * No surgery found *         Interval History: NAEON. Afebrile. HDS. Coughing this AM. SLP recommending NPO. Satting >97% on RA. BM x2 yetserday    Medications:  Continuous Infusions:   dextrose 5 % and 0.45 % NaCl with KCl 20 mEq 100 mL/hr at 03/28/24 0119     Scheduled Meds:   bisacodyL  10 mg Rectal Daily    clonazePAM  1 mg Per NG tube BID    enoxparin  40 mg Subcutaneous Daily    EScitalopram oxalate  20 mg Per NG tube Daily    lamoTRIgine  100 mg Per NG tube BID    OLANZapine  10 mg Per NG tube Nightly    pantoprazole  40 mg Intravenous BID    polyethylene glycol  17 g Per NG tube Daily    risperiDONE  0.5 mg Per NG tube QHS    valproic acid (as sodium salt)  250 mg Per NG tube Q6H     PRN Meds:acetaminophen, dextrose 10%, dextrose 10%, glucagon (human recombinant), glucose, glucose, ondansetron, prochlorperazine, sodium chloride 0.9%     Review of patient's allergies indicates:  No Known Allergies  Objective:     Vital Signs (Most Recent):  Temp: 97.4 °F (36.3 °C) (03/28/24 0636)  Pulse: 99 (03/28/24 0713)  Resp: 18 (03/28/24 0713)  BP: 117/81 (03/28/24 0713)  SpO2: 100 % (03/28/24 0713) Vital Signs (24h Range):  Temp:  [97.3 °F (36.3 °C)-97.4 °F (36.3 °C)] 97.4 °F (36.3 °C)  Pulse:  [] 99  Resp:  [16-18] 18  SpO2:  [96 %-100 %] 100 %  BP: (117-154)/(56-89) 117/81     Weight: 63 kg (139 lb)  Body mass index is 22.44 kg/m².    Intake/Output - Last 3 Shifts         03/26 0700 03/27 0659 03/27 0700 03/28 0659 03/28 0700 03/29 0659    I.V. (mL/kg) 1325.2 (21)      NG/GT       IV Piggyback 21.1      Total Intake(mL/kg) 1346.3 (21.4)      Urine (mL/kg/hr)       Drains       Stool       Total Output       Net +1346.3             Urine Occurrence 2 x      Stool Occurrence 1 x 2 x             Physical Exam  Vitals and nursing note reviewed.   Constitutional:       General: He is  not in acute distress.     Appearance: He is not ill-appearing.      Comments: JORDON LEAL:      Head: Normocephalic and atraumatic.      Nose: Nose normal.   Eyes:      General: No scleral icterus.  Cardiovascular:      Rate and Rhythm: Normal rate.   Pulmonary:      Effort: Pulmonary effort is normal. No respiratory distress.   Abdominal:      General: There is distension (improving).      Palpations: Abdomen is soft.      Tenderness: There is no abdominal tenderness. There is no guarding or rebound.   Musculoskeletal:         General: No deformity.   Skin:     General: Skin is warm.   Neurological:      General: No focal deficit present.      Mental Status: He is alert.          Significant Labs:  I have reviewed all pertinent lab results within the past 24 hours.  CBC:   Recent Labs   Lab 03/28/24  0608   WBC 6.58   RBC 4.10*   HGB 12.5*   HCT 39.1*      MCV 95   MCH 30.5   MCHC 32.0       CMP:   Recent Labs   Lab 03/28/24  0608   GLU 86   CALCIUM 9.6   ALBUMIN 2.6*   PROT 6.5      K 4.8   CO2 24      BUN 5*   CREATININE 0.7   ALKPHOS 168*   ALT 36   AST 36   BILITOT 0.3         Significant Diagnostics:  I have reviewed all pertinent imaging results/findings within the past 24 hours.  Assessment/Plan:     * Ileus  Patient is a 59 year old male with PMHx including cerebral palsy transferred for concern of ischemic bowel and GI bleed. Patient likely has chronic ileus given neurologic and psych history. Lactate down trending with rehydration. Passing flatus, having BM    - CXR this AM   - Diet pending SLP  - SLP eval ongoing. Recommending NPO for now  - CPT, inhalation tx, IS  - mIVF with D5 1/2 NS given NPO  - Hold xarelto given [facility] concern for GI bleed. Will restart pending SLP eval, dietary progression (possible PEG?)  - Lovenox since admit   - PPI  - Bowel regimen (daily suppository, miralax)  - Daily labs  - Replete lytes   - Home meds    Dispo: not yet medically stable for dc. Lives at home  with 24/7 sitters    Case discussed with Dr. Saldivar.      GIANCARLO MathewsC  General Surgery  [facility]

## 2024-03-28 NOTE — PT/OT/SLP PROGRESS
"Speech Language Pathology Treatment    Patient Name:  Edinson Umanozr   MRN:  [id number]  Admitting Diagnosis: Ileus     Recommendations:                 General Recommendations:  Dysphagia therapy  Diet recommendations: When cleared by MD team to resume PO, Puree Diet - IDDSI Level 4, Liquid Diet Level: Mildly thick/Nectar thick liquids - IDDSI Level 2   Aspiration Precautions:  - strict 1:1 assistance with all PO required  - Only when awake and alert and vital signs stable  - small bites/sips  - all PO intake at 90 degree angle  - remain upright 30 minutes+ following all PO intake  - medications crushed in puree  - Continue to monitor for signs and symptoms of aspiration and discontinue oral feeding should you notice any of the following: watery eyes, reddened facial area, wet vocal quality, increased work of breathing, change in respiratory status, increased congestion, coughing, fever and/or change in level of alertness  General Precautions: Standard, aspiration, fall  Communication strategies:  go to room if call light pushed    Assessment:     Edinson Umanzor is a 59 y.o. male with an SLP diagnosis of Dysphagia.  Risk of aspiration remains due to lethargy, decreased endurance and underlying esophageal concerns for dysphagia. ST to continue to follow.     Subjective     SLP reviewed Pt with RN, RN confirmed Pt tolerated medications whole with nectar-thickened liquids, further explained Pt with episode of dry heaving earlier service day  SLP reviewed Pt with MD team, MD team cleared Pt for PO trials  Pt presents alert  He explains, "Cold drink"     Pain/Comfort:  Pain Rating 1: other (see comments) (unable to assess 2/2 underling cognitive status, inconsistent yes/no response, RN aware)  Pain Addressed 1: Nurse notified    Respiratory Status: Room air    Objective:     Has the patient been evaluated by SLP for swallowing?   Yes  Keep patient NPO? No     Pt found partially reclined in bed with Dietary at the " bedside feeding Pt straw sips thickened liquids. Dietary exited room as SLP initiated session. Pt with delayed gag reflex  with subsequent throat clear x1. RN notified and in room to help SLP reposition Pt upright in bed. Pt requested something to drink.  His voice was mildly strained with low intensity. He willingly accepted presentations of thin liquids (teaspoon sips x4, cup edge x1, straw sipx2), nectar-thickened liquids via straw (x3) and puree (1/2 tsp bites x2, full tsp bites x3.)  Pt with decreased closure around teaspoon/straw, lingual pumping and inconsistent timing of initiation across consistencies. Pt with diminished excursion upon palpation of the larynx. Pt with decreased closure and siphon from straw for sips of thin liquids. No overt S/S aspiration with trials of thickened liquids or puree accepted. SLP unable to r/o silent aspiration at the bedside. Radiology entered into the room to meet with Pt and session discontinued for testing. SLP reviewed findings with RN and MD team.       Goals:   Multidisciplinary Problems       SLP Goals          Problem: SLP    Goal Priority Disciplines Outcome   SLP Goal     SLP Ongoing, Progressing   Description: Speech Language Pathology Goals  Goals expected to be met by 4/4/24    1. Pt will participate in ongoing assessment of swallow function to determine safest, least restrictive means of nutrition/hydration  2. Educate Pt and family on aspiration precautions and SLP POC                           Plan:     Patient to be seen:  4 x/week   Plan of Care expires:  04/26/24  Plan of Care reviewed with:  patient   SLP Follow-Up:  Yes       Discharge recommendations:  Moderate Intensity Therapy     Time Tracking:     SLP Treatment Date:   03/28/24  Speech Start Time:  0855  Speech Stop Time:  0908     Speech Total Time (min):  13 min    Billable Minutes: Treatment Swallowing Dysfunction 13    03/28/2024

## 2024-03-28 NOTE — NURSING
Patient coughing hob elevated suction patient orally notified surgery team.  Respiratory notified as well patient sat 95-97% percent

## 2024-03-28 NOTE — CONSULTS
ELLA consulted for PIV insertion in real time using ultrasound guidance.    Indication: PVA  Gauge and length: 20 g x 1 3/4 inch   Location: RFA

## 2024-03-28 NOTE — SUBJECTIVE & OBJECTIVE
Interval History: NAEON. Afebrile. HDS. Coughing this AM. SLP recommending NPO. Satting >97% on RA. BM x2 yetserday    Medications:  Continuous Infusions:   dextrose 5 % and 0.45 % NaCl with KCl 20 mEq 100 mL/hr at 03/28/24 0119     Scheduled Meds:   bisacodyL  10 mg Rectal Daily    clonazePAM  1 mg Per NG tube BID    enoxparin  40 mg Subcutaneous Daily    EScitalopram oxalate  20 mg Per NG tube Daily    lamoTRIgine  100 mg Per NG tube BID    OLANZapine  10 mg Per NG tube Nightly    pantoprazole  40 mg Intravenous BID    polyethylene glycol  17 g Per NG tube Daily    risperiDONE  0.5 mg Per NG tube QHS    valproic acid (as sodium salt)  250 mg Per NG tube Q6H     PRN Meds:acetaminophen, dextrose 10%, dextrose 10%, glucagon (human recombinant), glucose, glucose, ondansetron, prochlorperazine, sodium chloride 0.9%     Review of patient's allergies indicates:  No Known Allergies  Objective:     Vital Signs (Most Recent):  Temp: 97.4 °F (36.3 °C) (03/28/24 0636)  Pulse: 99 (03/28/24 0713)  Resp: 18 (03/28/24 0713)  BP: 117/81 (03/28/24 0713)  SpO2: 100 % (03/28/24 0713) Vital Signs (24h Range):  Temp:  [97.3 °F (36.3 °C)-97.4 °F (36.3 °C)] 97.4 °F (36.3 °C)  Pulse:  [] 99  Resp:  [16-18] 18  SpO2:  [96 %-100 %] 100 %  BP: (117-154)/(56-89) 117/81     Weight: 63 kg (139 lb)  Body mass index is 22.44 kg/m².    Intake/Output - Last 3 Shifts         03/26 0700 03/27 0659 03/27 0700 03/28 0659 03/28 0700 03/29 0659    I.V. (mL/kg) 1325.2 (21)      NG/GT       IV Piggyback 21.1      Total Intake(mL/kg) 1346.3 (21.4)      Urine (mL/kg/hr)       Drains       Stool       Total Output       Net +1346.3             Urine Occurrence 2 x      Stool Occurrence 1 x 2 x              Physical Exam  Vitals and nursing note reviewed.   Constitutional:       General: He is not in acute distress.     Appearance: He is not ill-appearing.      Comments: JORDON LEAL:      Head: Normocephalic and atraumatic.      Nose: Nose normal.    Eyes:      General: No scleral icterus.  Cardiovascular:      Rate and Rhythm: Normal rate.   Pulmonary:      Effort: Pulmonary effort is normal. No respiratory distress.   Abdominal:      General: There is distension (improving).      Palpations: Abdomen is soft.      Tenderness: There is no abdominal tenderness. There is no guarding or rebound.   Musculoskeletal:         General: No deformity.   Skin:     General: Skin is warm.   Neurological:      General: No focal deficit present.      Mental Status: He is alert.          Significant Labs:  I have reviewed all pertinent lab results within the past 24 hours.  CBC:   Recent Labs   Lab 03/28/24  0608   WBC 6.58   RBC 4.10*   HGB 12.5*   HCT 39.1*      MCV 95   MCH 30.5   MCHC 32.0       CMP:   Recent Labs   Lab 03/28/24  0608   GLU 86   CALCIUM 9.6   ALBUMIN 2.6*   PROT 6.5      K 4.8   CO2 24      BUN 5*   CREATININE 0.7   ALKPHOS 168*   ALT 36   AST 36   BILITOT 0.3         Significant Diagnostics:  I have reviewed all pertinent imaging results/findings within the past 24 hours.

## 2024-03-28 NOTE — ASSESSMENT & PLAN NOTE
Patient is a 59 year old male with PMHx including cerebral palsy transferred for concern of ischemic bowel and GI bleed. Patient likely has chronic ileus given neurologic and psych history. Lactate down trending with rehydration. Passing flatus, having BM    - CXR this AM   - Diet pending SLP  - SLP eval ongoing. Recommending NPO for now  - CPT, inhalation tx, IS  - mIVF with D5 1/2 NS given NPO  - Hold xarelto given [facility] concern for GI bleed. Will restart pending SLP eval, dietary progression (possible PEG?)  - Lovenox since admit   - PPI  - Bowel regimen (daily suppository, miralax)  - Daily labs  - Replete lytes   - Home meds    Dispo: not yet medically stable for dc. Lives at home with 24/7 sitters

## 2024-03-28 NOTE — CONSULTS
Unable to attempt PIV placement d/t pt refused, covering his whole self and head with blanket. Please re-consult [facility] in the morning if unable to obtain access overnight.

## 2024-03-29 VITALS
TEMPERATURE: 97 F | WEIGHT: 139 LBS | DIASTOLIC BLOOD PRESSURE: 82 MMHG | HEART RATE: 94 BPM | HEIGHT: 66 IN | BODY MASS INDEX: 22.34 KG/M2 | OXYGEN SATURATION: 94 % | RESPIRATION RATE: 18 BRPM | SYSTOLIC BLOOD PRESSURE: 120 MMHG

## 2024-03-29 LAB
ALBUMIN SERPL BCP-MCNC: 2.3 G/DL (ref 3.5–5.2)
ALP SERPL-CCNC: 149 U/L (ref 55–135)
ALT SERPL W/O P-5'-P-CCNC: 42 U/L (ref 10–44)
ANION GAP SERPL CALC-SCNC: 6 MMOL/L (ref 8–16)
AST SERPL-CCNC: 42 U/L (ref 10–40)
BASOPHILS # BLD AUTO: 0.03 K/UL (ref 0–0.2)
BASOPHILS NFR BLD: 0.6 % (ref 0–1.9)
BILIRUB SERPL-MCNC: 0.3 MG/DL (ref 0.1–1)
BUN SERPL-MCNC: 3 MG/DL (ref 8–23)
CALCIUM SERPL-MCNC: 9.4 MG/DL (ref 8.7–10.5)
CHLORIDE SERPL-SCNC: 108 MMOL/L (ref 95–110)
CO2 SERPL-SCNC: 25 MMOL/L (ref 23–29)
CREAT SERPL-MCNC: 0.7 MG/DL (ref 0.5–1.4)
DIFFERENTIAL METHOD BLD: ABNORMAL
EOSINOPHIL # BLD AUTO: 0.2 K/UL (ref 0–0.5)
EOSINOPHIL NFR BLD: 3 % (ref 0–8)
ERYTHROCYTE [DISTWIDTH] IN BLOOD BY AUTOMATED COUNT: 14.1 % (ref 11.5–14.5)
EST. GFR  (NO RACE VARIABLE): >60 ML/MIN/1.73 M^2
GLUCOSE SERPL-MCNC: 104 MG/DL (ref 70–110)
HCT VFR BLD AUTO: 37.6 % (ref 40–54)
HGB BLD-MCNC: 11.8 G/DL (ref 14–18)
IMM GRANULOCYTES # BLD AUTO: 0.09 K/UL (ref 0–0.04)
IMM GRANULOCYTES NFR BLD AUTO: 1.7 % (ref 0–0.5)
LYMPHOCYTES # BLD AUTO: 1.9 K/UL (ref 1–4.8)
LYMPHOCYTES NFR BLD: 34.9 % (ref 18–48)
MAGNESIUM SERPL-MCNC: 2.1 MG/DL (ref 1.6–2.6)
MCH RBC QN AUTO: 30.5 PG (ref 27–31)
MCHC RBC AUTO-ENTMCNC: 31.4 G/DL (ref 32–36)
MCV RBC AUTO: 97 FL (ref 82–98)
MONOCYTES # BLD AUTO: 0.5 K/UL (ref 0.3–1)
MONOCYTES NFR BLD: 9.5 % (ref 4–15)
NEUTROPHILS # BLD AUTO: 2.7 K/UL (ref 1.8–7.7)
NEUTROPHILS NFR BLD: 50.3 % (ref 38–73)
NRBC BLD-RTO: 0 /100 WBC
PHOSPHATE SERPL-MCNC: 3.1 MG/DL (ref 2.7–4.5)
PLATELET # BLD AUTO: 188 K/UL (ref 150–450)
PMV BLD AUTO: 11.9 FL (ref 9.2–12.9)
POCT GLUCOSE: 106 MG/DL (ref 70–110)
POCT GLUCOSE: 121 MG/DL (ref 70–110)
POCT GLUCOSE: 95 MG/DL (ref 70–110)
POTASSIUM SERPL-SCNC: 4.1 MMOL/L (ref 3.5–5.1)
PROT SERPL-MCNC: 6.4 G/DL (ref 6–8.4)
RBC # BLD AUTO: 3.87 M/UL (ref 4.6–6.2)
SODIUM SERPL-SCNC: 139 MMOL/L (ref 136–145)
WBC # BLD AUTO: 5.38 K/UL (ref 3.9–12.7)

## 2024-03-29 PROCEDURE — 85025 COMPLETE CBC W/AUTO DIFF WBC: CPT

## 2024-03-29 PROCEDURE — 83735 ASSAY OF MAGNESIUM: CPT

## 2024-03-29 PROCEDURE — 36415 COLL VENOUS BLD VENIPUNCTURE: CPT

## 2024-03-29 PROCEDURE — 84100 ASSAY OF PHOSPHORUS: CPT

## 2024-03-29 PROCEDURE — 63600175 PHARM REV CODE 636 W HCPCS

## 2024-03-29 PROCEDURE — 80053 COMPREHEN METABOLIC PANEL: CPT

## 2024-03-29 PROCEDURE — S5010 5% DEXTROSE AND 0.45% SALINE: HCPCS | Performed by: STUDENT IN AN ORGANIZED HEALTH CARE EDUCATION/TRAINING PROGRAM

## 2024-03-29 PROCEDURE — 25000003 PHARM REV CODE 250

## 2024-03-29 PROCEDURE — C9113 INJ PANTOPRAZOLE SODIUM, VIA: HCPCS

## 2024-03-29 PROCEDURE — 25000003 PHARM REV CODE 250: Performed by: STUDENT IN AN ORGANIZED HEALTH CARE EDUCATION/TRAINING PROGRAM

## 2024-03-29 RX ORDER — POLYETHYLENE GLYCOL 3350 17 G/17G
17 POWDER, FOR SOLUTION ORAL DAILY
Qty: 1530 G | Refills: 0 | Status: SHIPPED | OUTPATIENT
Start: 2024-03-30 | End: 2024-06-28

## 2024-03-29 RX ORDER — BISACODYL 10 MG/1
10 SUPPOSITORY RECTAL DAILY
Qty: 30 SUPPOSITORY | Refills: 0 | Status: ON HOLD | OUTPATIENT
Start: 2024-03-30 | End: 2024-04-01

## 2024-03-29 RX ADMIN — VALPROIC ACID 250 MG: 250 SOLUTION ORAL at 06:03

## 2024-03-29 RX ADMIN — POLYETHYLENE GLYCOL 3350 17 G: 17 POWDER, FOR SOLUTION ORAL at 08:03

## 2024-03-29 RX ADMIN — CLONAZEPAM 1 MG: 0.25 TABLET, ORALLY DISINTEGRATING ORAL at 08:03

## 2024-03-29 RX ADMIN — LAMOTRIGINE 100 MG: 100 TABLET ORAL at 08:03

## 2024-03-29 RX ADMIN — RIVAROXABAN 20 MG: 20 TABLET, FILM COATED ORAL at 04:03

## 2024-03-29 RX ADMIN — PANTOPRAZOLE SODIUM 40 MG: 40 INJECTION, POWDER, FOR SOLUTION INTRAVENOUS at 08:03

## 2024-03-29 RX ADMIN — BISACODYL 10 MG: 10 SUPPOSITORY RECTAL at 08:03

## 2024-03-29 RX ADMIN — VALPROIC ACID 250 MG: 250 SOLUTION ORAL at 12:03

## 2024-03-29 RX ADMIN — DEXTROSE AND SODIUM CHLORIDE: 5; 450 INJECTION, SOLUTION INTRAVENOUS at 08:03

## 2024-03-29 RX ADMIN — ESCITALOPRAM OXALATE 20 MG: 5 TABLET, FILM COATED ORAL at 08:03

## 2024-03-29 NOTE — NURSING
"Pt refused to open mouth for night med pass. Just said "no, no, no, cover my head". Notified family and also notified Dr. Mj Mahoney. Will attempt to give meds later if possible. Will continue to monitor.  "

## 2024-03-29 NOTE — SUBJECTIVE & OBJECTIVE
Interval History: NAEON. Pt overall feeling well. Tolerating diet. Denies N/V.    Medications:  Continuous Infusions:   dextrose 5 % and 0.45 % NaCl 100 mL/hr at 03/29/24 0856     Scheduled Meds:   bisacodyL  10 mg Rectal Daily    clonazePAM  1 mg Per NG tube BID    enoxparin  40 mg Subcutaneous Daily    EScitalopram oxalate  20 mg Per NG tube Daily    lamoTRIgine  100 mg Per NG tube BID    OLANZapine  10 mg Per NG tube Nightly    pantoprazole  40 mg Intravenous BID    polyethylene glycol  17 g Per NG tube Daily    risperiDONE  0.5 mg Per NG tube QHS    valproic acid (as sodium salt)  250 mg Per NG tube Q6H     PRN Meds:acetaminophen, dextrose 10%, dextrose 10%, glucagon (human recombinant), glucose, glucose, ondansetron, prochlorperazine, sodium chloride 0.9%     Review of patient's allergies indicates:  No Known Allergies  Objective:     Vital Signs (Most Recent):  Temp: 97.6 °F (36.4 °C) (03/29/24 0747)  Pulse: 82 (03/29/24 0747)  Resp: 18 (03/29/24 0747)  BP: 116/63 (03/29/24 0747)  SpO2: 98 % (03/29/24 0747) Vital Signs (24h Range):  Temp:  [97.3 °F (36.3 °C)-98.4 °F (36.9 °C)] 97.6 °F (36.4 °C)  Pulse:  [] 82  Resp:  [18] 18  SpO2:  [95 %-100 %] 98 %  BP: (105-132)/(56-76) 116/63     Weight: 63 kg (139 lb)  Body mass index is 22.44 kg/m².    Intake/Output - Last 3 Shifts         03/27 0700  03/28 0659 03/28 0700 03/29 0659 03/29 0700 03/30 0659    I.V. (mL/kg)  653.9 (10.4)     IV Piggyback       Total Intake(mL/kg)  653.9 (10.4)     Net  +653.9            Urine Occurrence  10 x     Stool Occurrence 2 x 2 x              Physical Exam  Vitals and nursing note reviewed.   Constitutional:       General: He is not in acute distress.     Appearance: He is not ill-appearing.      Comments: JORDON LEAL:      Head: Normocephalic and atraumatic.      Nose: Nose normal.   Eyes:      General: No scleral icterus.  Cardiovascular:      Rate and Rhythm: Normal rate.   Pulmonary:      Effort: Pulmonary effort is  normal. No respiratory distress.   Abdominal:      General: Distension: improving.      Palpations: Abdomen is soft.      Tenderness: There is no abdominal tenderness. There is no guarding or rebound.   Musculoskeletal:         General: No deformity.   Skin:     General: Skin is warm.   Neurological:      General: No focal deficit present.      Mental Status: He is alert.          Significant Labs:  I have reviewed all pertinent lab results within the past 24 hours.    Significant Diagnostics:  I have reviewed all pertinent imaging results/findings within the past 24 hours.

## 2024-03-29 NOTE — PROGRESS NOTES
[facility] - [facility]  General Surgery  Progress Note    Subjective:     History of Present Illness:  No notes on file    Post-Op Info:  * No surgery found *         Interval History: NAEON. Pt overall feeling well. Tolerating diet. Denies N/V.    Medications:  Continuous Infusions:   dextrose 5 % and 0.45 % NaCl 100 mL/hr at 03/29/24 0856     Scheduled Meds:   bisacodyL  10 mg Rectal Daily    clonazePAM  1 mg Per NG tube BID    enoxparin  40 mg Subcutaneous Daily    EScitalopram oxalate  20 mg Per NG tube Daily    lamoTRIgine  100 mg Per NG tube BID    OLANZapine  10 mg Per NG tube Nightly    pantoprazole  40 mg Intravenous BID    polyethylene glycol  17 g Per NG tube Daily    risperiDONE  0.5 mg Per NG tube QHS    valproic acid (as sodium salt)  250 mg Per NG tube Q6H     PRN Meds:acetaminophen, dextrose 10%, dextrose 10%, glucagon (human recombinant), glucose, glucose, ondansetron, prochlorperazine, sodium chloride 0.9%     Review of patient's allergies indicates:  No Known Allergies  Objective:     Vital Signs (Most Recent):  Temp: 97.6 °F (36.4 °C) (03/29/24 0747)  Pulse: 82 (03/29/24 0747)  Resp: 18 (03/29/24 0747)  BP: 116/63 (03/29/24 0747)  SpO2: 98 % (03/29/24 0747) Vital Signs (24h Range):  Temp:  [97.3 °F (36.3 °C)-98.4 °F (36.9 °C)] 97.6 °F (36.4 °C)  Pulse:  [] 82  Resp:  [18] 18  SpO2:  [95 %-100 %] 98 %  BP: (105-132)/(56-76) 116/63     Weight: 63 kg (139 lb)  Body mass index is 22.44 kg/m².    Intake/Output - Last 3 Shifts         03/27 0700 03/28 0659 03/28 0700 03/29 0659 03/29 0700 03/30 0659    I.V. (mL/kg)  653.9 (10.4)     IV Piggyback       Total Intake(mL/kg)  653.9 (10.4)     Net  +653.9            Urine Occurrence  10 x     Stool Occurrence 2 x 2 x              Physical Exam  Vitals and nursing note reviewed.   Constitutional:       General: He is not in acute distress.     Appearance: He is not ill-appearing.      Comments: JORDON LEAL:      Head: Normocephalic and atraumatic.       Nose: Nose normal.   Eyes:      General: No scleral icterus.  Cardiovascular:      Rate and Rhythm: Normal rate.   Pulmonary:      Effort: Pulmonary effort is normal. No respiratory distress.   Abdominal:      General: Distension: improving.      Palpations: Abdomen is soft.      Tenderness: There is no abdominal tenderness. There is no guarding or rebound.   Musculoskeletal:         General: No deformity.   Skin:     General: Skin is warm.   Neurological:      General: No focal deficit present.      Mental Status: He is alert.          Significant Labs:  I have reviewed all pertinent lab results within the past 24 hours.    Significant Diagnostics:  I have reviewed all pertinent imaging results/findings within the past 24 hours.  Assessment/Plan:     * Ileus  Patient is a 59 year old male with PMHx including cerebral palsy transferred for concern of ischemic bowel and GI bleed. Patient likely has chronic ileus given neurologic and psych history. Lactate down trending with rehydration. Passing flatus, having BM.    - SLP cleared for Puree Diet - IDDSI Level 4, Liquid Diet Level: Mildly thick/Nectar thick liquids - IDDSI Level 2  - no NGT needed  - CXR 3/29 no signs of PNA  - CPT, inhalation tx, IS  - mIVF with D5 1/2 NS will d/c if tolerating diet  - Xarelto to restart at 5pm tonight instead of lovenox (lovenox D/c'd)  - PPI  - Bowel regimen (daily suppository, miralax)  - Daily labs  - Replete lytes   - Home meds    Dispo: pending toleration of diet can go home today        Keyon Galo MD  General Surgery  [facility]

## 2024-03-29 NOTE — PLAN OF CARE
Problem: Adult Inpatient Plan of Care  Goal: Plan of Care Review  Outcome: Met  Goal: Patient-Specific Goal (Individualized)  Outcome: Met  Goal: Absence of Hospital-Acquired Illness or Injury  Outcome: Met  Goal: Optimal Comfort and Wellbeing  Outcome: Met  Goal: Readiness for Transition of Care  Outcome: Met     Problem: Fall Injury Risk  Goal: Absence of Fall and Fall-Related Injury  Outcome: Met     Problem: Cognitive Impairment  Goal: Optimal Cognitive Function  Outcome: Met     Problem: Fluid Deficit (Intestinal Obstruction)  Goal: Fluid Balance  Outcome: Met     Problem: Infection (Intestinal Obstruction)  Goal: Absence of Infection Signs and Symptoms  Outcome: Met     Problem: Nausea and Vomiting (Intestinal Obstruction)  Goal: Nausea and Vomiting Relief  Outcome: Met     Problem: Pain (Intestinal Obstruction)  Goal: Acceptable Pain Control  Outcome: Met     Problem: Impaired Wound Healing  Goal: Optimal Wound Healing  Outcome: Met

## 2024-03-29 NOTE — PLAN OF CARE
"[facility] Plan of Care Note        Dx: Ileus     Shift Events: pt refused all medications, and stayed curled in a ball on left side all shift. Any time interaction occurred, pt just said "no no no, cover my head"     Goals of Care: Patient to maintain placement of iv sites     Neuro: Alert, oriented to self     Vital Signs:stable     Respiratory: RA     Diet: clear liquid, with nectar thick liquids     Is patient tolerating current diet? yes     GTTS: D51/2 NS 20k@100/hr     Urine Output/Bowel Movement: adequate urine output/LBM 03/28/24     Drains/Tubes/Tube Feeds (include total output/shift) see flowsheet     Lines: 1 PIV 20g to RFA      Accuchecks: Q4     Skin: Scabbing to the right shoulder, thigh and knee due to previous fall.     Fall Risk Score: 17     Activity level? Completely dependent     Any scheduled procedures? No     Any safety concerns? Fall risk; Aspiration  "

## 2024-03-29 NOTE — ASSESSMENT & PLAN NOTE
Patient is a 59 year old male with PMHx including cerebral palsy transferred for concern of ischemic bowel and GI bleed. Patient likely has chronic ileus given neurologic and psych history. Lactate down trending with rehydration. Passing flatus, having BM.    - SLP cleared for Puree Diet - IDDSI Level 4, Liquid Diet Level: Mildly thick/Nectar thick liquids - IDDSI Level 2  - no NGT needed  - CXR 3/29 no signs of PNA  - CPT, inhalation tx, IS  - mIVF with D5 1/2 NS will d/c if tolerating diet  - Xarelto to restart at 5pm tonight instead of lovenox (lovenox D/c'd)  - PPI  - Bowel regimen (daily suppository, miralax)  - Daily labs  - Replete lytes   - Home meds    Dispo: pending toleration of diet can go home today

## 2024-03-30 ENCOUNTER — NURSE TRIAGE (OUTPATIENT)
Dept: ADMINISTRATIVE | Facility: CLINIC | Age: 59
End: 2024-03-30
Payer: MEDICAID

## 2024-03-30 NOTE — TELEPHONE ENCOUNTER
Spoke with Rosamaria with [facility] . She is requesting to speak with the on call provider. States that the patient was discharged with an order for dulcolax suppositories but his in home DSW caregivers are not trained to administer suppositories. Rosamaria is requesting that the route of the dulcolax is changed to oral.     Attempted to contact an on call provider at [facility] but was informed by the  that no on is on call for family medicine. Will route a message to the patient's provider to f/u with the home health nurse. Caller was advised and verbalizes understanding. Advised the patient to call back with any further questions or concerns.         Reason for Disposition   [1] Caller has URGENT medicine question about med that PCP or specialist prescribed AND [2] triager unable to answer question    Protocols used: Medication Question Call-A-

## 2024-03-30 NOTE — NURSING
Pt oriented to self, VS stable.  Pt IV & telemetry box removed.  Pt home meds given to brothers.  Family provided with discharge instructions, verbalizes understanding.  Pt currently awaiting transport.

## 2024-04-01 ENCOUNTER — HOSPITAL ENCOUNTER (INPATIENT)
Facility: HOSPITAL | Age: 59
LOS: 1 days | Discharge: HOME | DRG: 390 | End: 2024-04-02
Attending: EMERGENCY MEDICINE | Admitting: SURGERY
Payer: MEDICAID

## 2024-04-01 DIAGNOSIS — K56.609 SMALL BOWEL OBSTRUCTION: ICD-10-CM

## 2024-04-01 DIAGNOSIS — K56.0 ADYNAMIC ILEUS: Primary | ICD-10-CM

## 2024-04-01 DIAGNOSIS — F79 INTELLECTUAL DISABILITY: ICD-10-CM

## 2024-04-01 DIAGNOSIS — K56.7 ILEUS: ICD-10-CM

## 2024-04-01 DIAGNOSIS — G80.9 CEREBRAL PALSY, UNSPECIFIED TYPE: ICD-10-CM

## 2024-04-01 DIAGNOSIS — I82.402 ACUTE DEEP VEIN THROMBOSIS (DVT) OF LEFT LOWER EXTREMITY, UNSPECIFIED VEIN: ICD-10-CM

## 2024-04-01 LAB
ANION GAP SERPL CALC-SCNC: 8 MMOL/L (ref 8–16)
BUN SERPL-MCNC: 18 MG/DL (ref 8–23)
CALCIUM SERPL-MCNC: 8.4 MG/DL (ref 8.7–10.5)
CHLORIDE SERPL-SCNC: 111 MMOL/L (ref 95–110)
CO2 SERPL-SCNC: 24 MMOL/L (ref 23–29)
CREAT SERPL-MCNC: 0.8 MG/DL (ref 0.5–1.4)
EST. GFR  (NO RACE VARIABLE): >60 ML/MIN/1.73 M^2
GLUCOSE SERPL-MCNC: 79 MG/DL (ref 70–110)
HCV AB SERPL QL IA: NORMAL
HIV 1+2 AB+HIV1 P24 AG SERPL QL IA: NORMAL
MAGNESIUM SERPL-MCNC: 1.9 MG/DL (ref 1.6–2.6)
PHOSPHATE SERPL-MCNC: 3.2 MG/DL (ref 2.7–4.5)
POTASSIUM SERPL-SCNC: 4 MMOL/L (ref 3.5–5.1)
SODIUM SERPL-SCNC: 143 MMOL/L (ref 136–145)

## 2024-04-01 PROCEDURE — 25000003 PHARM REV CODE 250: Performed by: STUDENT IN AN ORGANIZED HEALTH CARE EDUCATION/TRAINING PROGRAM

## 2024-04-01 PROCEDURE — 87389 HIV-1 AG W/HIV-1&-2 AB AG IA: CPT | Performed by: PHYSICIAN ASSISTANT

## 2024-04-01 PROCEDURE — 86803 HEPATITIS C AB TEST: CPT | Performed by: PHYSICIAN ASSISTANT

## 2024-04-01 PROCEDURE — 83735 ASSAY OF MAGNESIUM: CPT | Performed by: STUDENT IN AN ORGANIZED HEALTH CARE EDUCATION/TRAINING PROGRAM

## 2024-04-01 PROCEDURE — 63600175 PHARM REV CODE 636 W HCPCS: Performed by: STUDENT IN AN ORGANIZED HEALTH CARE EDUCATION/TRAINING PROGRAM

## 2024-04-01 PROCEDURE — 80048 BASIC METABOLIC PNL TOTAL CA: CPT | Performed by: STUDENT IN AN ORGANIZED HEALTH CARE EDUCATION/TRAINING PROGRAM

## 2024-04-01 PROCEDURE — 99285 EMERGENCY DEPT VISIT HI MDM: CPT

## 2024-04-01 PROCEDURE — 25000003 PHARM REV CODE 250

## 2024-04-01 PROCEDURE — 20600001 HC STEP DOWN PRIVATE ROOM

## 2024-04-01 PROCEDURE — 0D9670Z DRAINAGE OF STOMACH WITH DRAINAGE DEVICE, VIA NATURAL OR ARTIFICIAL OPENING: ICD-10-PCS | Performed by: SURGERY

## 2024-04-01 PROCEDURE — 84100 ASSAY OF PHOSPHORUS: CPT | Performed by: STUDENT IN AN ORGANIZED HEALTH CARE EDUCATION/TRAINING PROGRAM

## 2024-04-01 RX ORDER — ACETAMINOPHEN 325 MG/1
650 TABLET ORAL EVERY 8 HOURS PRN
Status: DISCONTINUED | OUTPATIENT
Start: 2024-04-01 | End: 2024-04-02 | Stop reason: HOSPADM

## 2024-04-01 RX ORDER — SENNOSIDES 8.6 MG/1
8.6 TABLET ORAL 2 TIMES DAILY
Status: DISCONTINUED | OUTPATIENT
Start: 2024-04-01 | End: 2024-04-02 | Stop reason: HOSPADM

## 2024-04-01 RX ORDER — PROCHLORPERAZINE EDISYLATE 5 MG/ML
5 INJECTION INTRAMUSCULAR; INTRAVENOUS EVERY 6 HOURS PRN
Status: DISCONTINUED | OUTPATIENT
Start: 2024-04-01 | End: 2024-04-02 | Stop reason: HOSPADM

## 2024-04-01 RX ORDER — SYRING-NEEDL,DISP,INSUL,0.3 ML 29 G X1/2"
296 SYRINGE, EMPTY DISPOSABLE MISCELLANEOUS
Status: COMPLETED | OUTPATIENT
Start: 2024-04-01 | End: 2024-04-01

## 2024-04-01 RX ORDER — SODIUM CHLORIDE, SODIUM LACTATE, POTASSIUM CHLORIDE, CALCIUM CHLORIDE 600; 310; 30; 20 MG/100ML; MG/100ML; MG/100ML; MG/100ML
INJECTION, SOLUTION INTRAVENOUS CONTINUOUS
Status: DISCONTINUED | OUTPATIENT
Start: 2024-04-01 | End: 2024-04-02

## 2024-04-01 RX ORDER — ESCITALOPRAM OXALATE 20 MG/1
20 TABLET ORAL EVERY MORNING
Status: DISCONTINUED | OUTPATIENT
Start: 2024-04-01 | End: 2024-04-02 | Stop reason: HOSPADM

## 2024-04-01 RX ORDER — ENOXAPARIN SODIUM 100 MG/ML
40 INJECTION SUBCUTANEOUS EVERY 24 HOURS
Status: DISCONTINUED | OUTPATIENT
Start: 2024-04-01 | End: 2024-04-02

## 2024-04-01 RX ORDER — ONDANSETRON HYDROCHLORIDE 2 MG/ML
4 INJECTION, SOLUTION INTRAVENOUS EVERY 12 HOURS PRN
Status: DISCONTINUED | OUTPATIENT
Start: 2024-04-01 | End: 2024-04-02 | Stop reason: HOSPADM

## 2024-04-01 RX ORDER — RISPERIDONE 0.5 MG/1
0.5 TABLET ORAL NIGHTLY
Status: DISCONTINUED | OUTPATIENT
Start: 2024-04-01 | End: 2024-04-02 | Stop reason: HOSPADM

## 2024-04-01 RX ORDER — LAMOTRIGINE 100 MG/1
100 TABLET ORAL 2 TIMES DAILY
Status: DISCONTINUED | OUTPATIENT
Start: 2024-04-01 | End: 2024-04-02 | Stop reason: HOSPADM

## 2024-04-01 RX ORDER — PSEUDOEPHEDRINE/ACETAMINOPHEN 30MG-500MG
100 TABLET ORAL
Status: COMPLETED | OUTPATIENT
Start: 2024-04-01 | End: 2024-04-01

## 2024-04-01 RX ORDER — BISACODYL 5 MG
5 TABLET, DELAYED RELEASE (ENTERIC COATED) ORAL DAILY PRN
Qty: 20 TABLET | Refills: 0 | OUTPATIENT
Start: 2024-04-01 | End: 2024-04-02

## 2024-04-01 RX ORDER — POLYETHYLENE GLYCOL 3350 17 G/17G
17 POWDER, FOR SOLUTION ORAL DAILY
Status: DISCONTINUED | OUTPATIENT
Start: 2024-04-02 | End: 2024-04-02

## 2024-04-01 RX ORDER — DIVALPROEX SODIUM 500 MG/1
500 TABLET, FILM COATED, EXTENDED RELEASE ORAL 2 TIMES DAILY
Status: DISCONTINUED | OUTPATIENT
Start: 2024-04-01 | End: 2024-04-02 | Stop reason: HOSPADM

## 2024-04-01 RX ORDER — CLONAZEPAM 0.5 MG/1
1 TABLET ORAL 2 TIMES DAILY
Status: DISCONTINUED | OUTPATIENT
Start: 2024-04-01 | End: 2024-04-02 | Stop reason: HOSPADM

## 2024-04-01 RX ADMIN — DIVALPROEX SODIUM 500 MG: 500 TABLET, FILM COATED, EXTENDED RELEASE ORAL at 10:04

## 2024-04-01 RX ADMIN — SODIUM CHLORIDE, SODIUM LACTATE, POTASSIUM CHLORIDE, AND CALCIUM CHLORIDE: 600; 310; 30; 20 INJECTION, SOLUTION INTRAVENOUS at 04:04

## 2024-04-01 RX ADMIN — RISPERIDONE 0.5 MG: 0.5 TABLET ORAL at 10:04

## 2024-04-01 RX ADMIN — DIVALPROEX SODIUM 500 MG: 500 TABLET, FILM COATED, EXTENDED RELEASE ORAL at 09:04

## 2024-04-01 RX ADMIN — SENNOSIDES 8.6 MG: 8.6 TABLET, FILM COATED ORAL at 10:04

## 2024-04-01 RX ADMIN — SODIUM CHLORIDE 500 ML: 9 INJECTION, SOLUTION INTRAVENOUS at 09:04

## 2024-04-01 RX ADMIN — LAMOTRIGINE 100 MG: 100 TABLET ORAL at 09:04

## 2024-04-01 RX ADMIN — CLONAZEPAM 1 MG: 0.5 TABLET ORAL at 09:04

## 2024-04-01 RX ADMIN — LAMOTRIGINE 100 MG: 100 TABLET ORAL at 10:04

## 2024-04-01 RX ADMIN — ESCITALOPRAM OXALATE 20 MG: 20 TABLET ORAL at 09:04

## 2024-04-01 RX ADMIN — Medication 100 ML: at 09:04

## 2024-04-01 RX ADMIN — MAGNESIUM CITRATE 296 ML: 1.75 LIQUID ORAL at 09:04

## 2024-04-01 RX ADMIN — CLONAZEPAM 1 MG: 0.5 TABLET ORAL at 10:04

## 2024-04-01 RX ADMIN — ENOXAPARIN SODIUM 40 MG: 40 INJECTION SUBCUTANEOUS at 04:04

## 2024-04-01 NOTE — HPI
Edinson Umanzor is a 59yoM with a history of cerebral palsy, profound mental delay, HTN, HLD, recent DVT (on xarelto) who was diagnosed from [facility] on 3/30/24 following an admission with concern for bowel obstruction versus cecal volvulus. During that admission, he was managed conservatively and had spontaneous return of bowel function. According to the outside hospital emergency room records, his caregiver reports no further bowel function since his discharge, which prompted his presentation.     At the outside facility, the patient's work-up demonstrates normal lab work. His electrolytes are within normal limits. He has a normal lactic acid. CT scan demonstrates distended stomach, small intestine and colon. There is concern for ongoing bowel obstruction at his previous anastomosis, although there is signficant colonic beyond this point. There is dense fecalization of the sigmoid colon and rectum. He was transferred to [facility] for general surgery evaluation.     At the time of my exam, the patient is resting comfortably in bed. He has an NG tube in place with minimal brown, thin output. His abdomen is distended but remains soft and is non-tender. He is able to answer yes or no questions for me, and he denies any abdominal pain.

## 2024-04-01 NOTE — NURSING
[facility] Plan of Care Note  Dx: Bowel obstruction    Shift Events: Admitted to floor this AM.  Enema administered- large bowel movement to follow.    Goals of Care: fall free    Neuro: A&Ox2    Vital Signs: WDL    Respiratory: RA    Diet: NPO    Is patient tolerating current diet? yes    GTTS: LR@125    Urine Output/Bowel Movement: incontinentx2    Drains/Tubes/Tube Feeds (include total output/shift): NG to LIWS    Lines: 20G R upper arm      Accuchecks: none    Skin: R arm and knee abrasions    Fall Risk Score: see flowsheet    Activity level? See flowsheet    Any scheduled procedures? none    Any safety concerns? falls    Other: none

## 2024-04-01 NOTE — CONSULTS
[facility] - Emergency Dept  General Surgery  Consult Note    Patient Name: Edinson Umanzor  MRN: [id number]  Code Status: Full Code  Admission Date: 4/1/2024  Hospital Length of Stay: 0 days  Attending Physician: Miguel Zhu MD  Primary Care Provider: Krishan Massey II, NP    Patient information was obtained from past medical records and ER records.     Inpatient consult to General surgery  Consult performed by: Clay Taylor MD  Consult ordered by: Radha Yao DO        Subjective:     Principal Problem: Bowel obstruction    History of Present Illness: Edinson Umanzor is a 59yoM with a history of cerebral palsy, profound mental delay, HTN, HLD, recent DVT (on xarelto) who was diagnosed from [facility] on 3/30/24 following an admission with concern for bowel obstruction versus cecal volvulus. During that admission, he was managed conservatively and had spontaneous return of bowel function. According to the outside hospital emergency room records, his caregiver reports no further bowel function since his discharge, which prompted his presentation.     At the outside facility, the patient's work-up demonstrates normal lab work. His electrolytes are within normal limits. He has a normal lactic acid. CT scan demonstrates distended stomach, small intestine and colon. There is concern for ongoing bowel obstruction at his previous anastomosis, although there is signficant colonic beyond this point. There is dense fecalization of the sigmoid colon and rectum. He was transferred to [facility] for general surgery evaluation.     At the time of my exam, the patient is resting comfortably in bed. He has an NG tube in place with minimal brown, thin output. His abdomen is distended but remains soft and is non-tender. He is able to answer yes or no questions for me, and he denies any abdominal pain.     Current Facility-Administered Medications on File Prior to Encounter   Medication    [COMPLETED] 0.9%  NaCl infusion     [COMPLETED] cefTRIAXone (ROCEPHIN) 1 g in dextrose 5 % 100 mL IVPB (ready to mix)    [COMPLETED] clonazePAM tablet 0.5 mg    [COMPLETED] hyoscyamine ODT tablet 0.125 mg    [COMPLETED] metoclopramide injection 10 mg    [COMPLETED] ondansetron injection 8 mg    [COMPLETED] risperiDONE tablet 0.5 mg    [COMPLETED] sodium chloride 0.9% bolus 1,000 mL 1,000 mL    [DISCONTINUED] lamoTRIgine tablet 100 mg    [DISCONTINUED] OLANZapine tablet 10 mg    [DISCONTINUED] traZODone tablet 50 mg     Current Outpatient Medications on File Prior to Encounter   Medication Sig    bisacodyL (DULCOLAX) 10 mg Supp Place 1 suppository (10 mg total) rectally once daily.    clonazePAM (KLONOPIN) 1 MG tablet Take 1 mg by mouth 2 (two) times daily.    divalproex ER (DEPAKOTE) 500 MG Tb24 Take 500 mg by mouth 2 (two) times a day.    docusate sodium (COLACE) 100 MG capsule 2    EScitalopram oxalate (LEXAPRO) 20 MG tablet Take 20 mg by mouth every morning.    ferrous gluconate (FERGON) 324 MG tablet TAKE ONE TABLET BY MOUTH IN THE MORNING    finasteride (PROSCAR) 5 mg tablet TAKE ONE TABLET BY MOUTH IN THE MORNING    folic acid (FOLVITE) 1 MG tablet Take 1 tablet (1,000 mcg total) by mouth once daily.    furosemide (LASIX) 40 MG tablet TAKE ONE TABLET BY MOUTH IN THE MORNING    hydrOXYzine pamoate (VISTARIL) 50 MG Cap Take 50 mg by mouth as needed.    hyoscyamine (ANASPAZ,LEVSIN) 0.125 mg Tab TAKE ONE TABLET BY MOUTH at 8am and 8pm (Patient taking differently: Take 125 mcg by mouth 2 (two) times a day.)    INVEGA SUSTENNA 234 mg/1.5 mL Syrg injection Inject 234 mg into the muscle every 30 days.    lamoTRIgine (LAMICTAL) 100 MG tablet Take 100 mg by mouth 2 (two) times daily.    OLANZapine (ZYPREXA) 10 MG tablet Take 10 mg by mouth nightly.    polyethylene glycol (GLYCOLAX) 17 gram/dose powder Use to cap to measure 17g, mix with liquid, and take by Per NG tube once daily.    potassium chloride SA (K-DUR,KLOR-CON) 20 MEQ tablet 1 po daily (Patient  taking differently: Take 20 mEq by mouth once daily. 1 po daily)    risperiDONE (RISPERDAL) 0.5 MG Tab Take 0.5 mg by mouth every evening.    rivaroxaban (XARELTO) 20 mg Tab Take 1 tablet (20 mg total) by mouth daily with dinner or evening meal.    traZODone (DESYREL) 50 MG tablet Take 50 mg by mouth every evening.    vitamin B complex no.12-niacin 50 mg/15 mL Liqd     vitamin D (VITAMIN D3) 1000 units Tab Take 1,000 Units by mouth once daily.       Review of patient's allergies indicates:  No Known Allergies    Past Medical History:   Diagnosis Date    Anxiety     Bipolar 1 disorder     BPH (benign prostatic hyperplasia)     Cerebral palsy     Depression     Frequent UTI     HTN (hypertension)     Hypercholesteremia     Mental retardation     Mood disorder due to medical condition     Schizophrenia      Past Surgical History:   Procedure Laterality Date    BOWEL RESECTION      COLONOSCOPY N/A 3/12/2018    Procedure: COLONOSCOPY;  Surgeon: Chip Villanueva MD;  Location: [facility];  Service: Endoscopy;  Laterality: N/A;     Family History       Problem Relation (Age of Onset)    Breast cancer Sister    Heart disease Sister, Sister    No Known Problems Father, Mother          Tobacco Use    Smoking status: Never    Smokeless tobacco: Never   Substance and Sexual Activity    Alcohol use: No    Drug use: No    Sexual activity: Never     Review of Systems   Unable to perform ROS: Other     Objective:     Vital Signs (Most Recent):  Temp: 97.9 °F (36.6 °C) (04/01/24 0207)  Pulse: 100 (04/01/24 0207)  Resp: 20 (04/01/24 0207)  BP: 115/80 (04/01/24 0207)  SpO2: 99 % (04/01/24 0207) Vital Signs (24h Range):  Temp:  [97.7 °F (36.5 °C)-97.9 °F (36.6 °C)] 97.9 °F (36.6 °C)  Pulse:  [100-115] 100  Resp:  [20-34] 20  SpO2:  [96 %-99 %] 99 %  BP: (115-123)/(77-86) 115/80     Weight: 61.2 kg (135 lb)  Body mass index is 21.79 kg/m².     Physical Exam  Vitals and nursing note reviewed.   Constitutional:       General: He is  not in acute distress.     Appearance: He is not toxic-appearing.   HENT:      Nose:      Comments: NG tube in place with thin, brown output  Cardiovascular:      Rate and Rhythm: Regular rhythm. Tachycardia present.   Pulmonary:      Effort: Pulmonary effort is normal. No respiratory distress.   Abdominal:      Comments: Abdomen soft, moderately distended  Non-tender throughout abdomen  No signs of peritonitis   Genitourinary:     Comments: Digital rectal exam performed without significant stool burden to be disimpacted  Skin:     General: Skin is warm.   Neurological:      Mental Status: He is alert. Mental status is at baseline.            I have reviewed all pertinent lab results within the past 24 hours.  CBC:   Recent Labs   Lab 03/31/24  1817   WBC 10.10   RBC 4.18*   HGB 13.0*   HCT 40.1      MCV 96   MCH 31.1*   MCHC 32.4     CMP:   Recent Labs   Lab 03/31/24  1817   *   CALCIUM 9.5   ALBUMIN 3.6   PROT 7.9      K 4.9   CO2 29      BUN 16   CREATININE 0.93   ALKPHOS 174*   *   AST 85*   BILITOT 0.4       Significant Diagnostics:  I have reviewed all pertinent imaging results/findings within the past 24 hours.  I reviewed his CT scan from today and compared it to 3/23/24. There is a persistent area in the posterior abdomen near his previous anastomosis that could represent a site of persistent small bowel obstruction.       Assessment/Plan:     * Bowel obstruction  Edinson Umanzor is a 59yoM with cerebral palsy, severe mental delay, psychiatric disturbances, HTN, HLD, recent DVT who [place] [facility] with ongoing concerns related to potential bowel obstruction. He has not had any meaningful bowel function since his recent discharge on 3/30. General surgery consulted for evaluation.     - admit to general surgery  - labs and imaging reviewed   - there is global distension of the stomach, small intestine and colon. There is an area near his previous bowel anastomosis that could  represent ongoing small bowel obstruction   - significant fecalization of the sigmoid colon and rectum; unable to be disimpacted on digital rectal exam  - NG in place  - keep to LIWS  - NPO, mIVFs  - lovenox for DVT ppx  - on xarelto at home  - plan for decompression with NG tube; would likely benefit from enemas to reduce stool burden of lower colon  - will discuss with staff       VTE Risk Mitigation (From admission, onward)           Ordered     enoxaparin injection 40 mg  Daily         04/01/24 0334     IP VTE HIGH RISK PATIENT  Once         04/01/24 0334     Place sequential compression device  Until discontinued         04/01/24 0334                    Thank you for your consult. I will follow-up with patient. Please contact us if you have any additional questions.    Clay Taylor MD  General Surgery  [facility] - Emergency Dept

## 2024-04-01 NOTE — PLAN OF CARE
[address] [facility]  Initial Discharge Assessment       Primary Care Provider: Krishan Massey II, NP    Admission Diagnosis: Small bowel obstruction [K56.609]    Admission Date: 4/1/2024  Expected Discharge Date:     Transition of Care Barriers: (P) None    Payor: MEDICAID / Plan: MEDICAID OF [place] / Product Type: Government /     Extended Emergency Contact Information  Primary Emergency Contact: Eleanor Topete   [place]  Mobile Phone: [phone]  Relation: Sister  Secondary Emergency Contact: Ailyn Beatty   [place]  Mobile Phone: [phone]  Relation: Relative    Discharge Plan A: (P) Home  Discharge Plan B: (P) [facility] - [place], [place] - [address]  [address] [address]  Phone: [phone] Fax: [phone]      Initial Assessment (most recent)       Adult Discharge Assessment - 04/01/24 1123          Discharge Assessment    Assessment Type Discharge Planning Assessment     Confirmed/corrected address, phone number and insurance Yes     Confirmed Demographics Correct on Facesheet     Source of Information family     If unable to respond/provide information was family/caregiver contacted? Yes     Contact Name/Number sister Vanegas [phone]     When was your last doctors appointment? --   unsure    Communicated SANDY with patient/caregiver Date not available/Unable to determine     Reason For Admission bowel obstruction (P)      People in Home alone (P)    with 24 hour care givers    Do you expect to return to your current living situation? Yes (P)      Do you have help at home or someone to help you manage your care at home? Yes (P)      Who are your caregiver(s) and their phone number(s)? Patient  reports he has 24 hour  care at home (P)      Prior to hospitilization cognitive status: -- (P)    mentally delayed    Current cognitive status: -- (P)    mentally delayed    Walking or Climbing Stairs  Difficulty no (P)      Dressing/Bathing Difficulty no (P)      Home Layout Able to live on 1st floor (P)      Equipment Currently Used at Home wheelchair (P)      Readmission within 30 days? Yes (P)      Patient currently being followed by outpatient case management? No (P)      Do you currently have service(s) that help you manage your care at home? Yes (P)      Is the pt/caregiver preference to resume services with current agency Yes (P)      Do you take prescription medications? Yes (P)      Do you have prescription coverage? Yes (P)      Coverage Medicaid (P)      Do you have any problems affording any of your prescribed medications? No (P)      How do you get to doctors appointments? -- (P)    caregivers    Are you on dialysis? No (P)      Do you take coumadin? No (P)      Discharge Plan A Home (P)      Discharge Plan B Home Health (P)      DME Needed Upon Discharge  -- (P)    TBD    Discharge Plan discussed with: Sibling (P)      Name(s) and Number(s) Eleanor Topete (P)      Transition of Care Barriers None (P)         Physical Activity    On average, how many days per week do you engage in moderate to strenuous exercise (like a brisk walk)? 0 days (P)      On average, how many minutes do you engage in exercise at this level? 0 min (P)         Financial Resource Strain    How hard is it for you to pay for the very basics like food, housing, medical care, and heating? Not hard at all (P)         Housing Stability    In the last 12 months, was there a time when you were not able to pay the mortgage or rent on time? No (P)      In the last 12 months, was there a time when you did not have a steady place to sleep or slept in a shelter (including now)? No (P)         Transportation Needs    In the past 12 months, has lack of transportation kept you from medical appointments or from getting medications? No (P)      In the past 12 months, has lack of transportation kept you from meetings, work, or from getting  things needed for daily living? No (P)         Food Insecurity    Within the past 12 months, you worried that your food would run out before you got the money to buy more. Never true (P)      Within the past 12 months, the food you bought just didn't last and you didn't have money to get more. Never true (P)         Stress    Do you feel stress - tense, restless, nervous, or anxious, or unable to sleep at night because your mind is troubled all the time - these days? Patient unable to answer (P)         Social Connections    In a typical week, how many times do you talk on the phone with family, friends, or neighbors? Patient unable to answer (P)      How often do you get together with friends or relatives? Patient unable to answer (P)      How often do you attend Worship or Yazdanism services? Patient unable to answer (P)      Do you belong to any clubs or organizations such as Worship groups, unions, fraternal or athletic groups, or school groups? Patient unable to answer (P)      How often do you attend meetings of the clubs or organizations you belong to? Patient unable to answer (P)      Are you , , , , never , or living with a partner? Patient unable to answer (P)         Alcohol Use    Q1: How often do you have a drink containing alcohol? Patient unable to answer (P)      Q2: How many drinks containing alcohol do you have on a typical day when you are drinking? Patient unable to answer (P)      Q3: How often do you have six or more drinks on one occasion? Patient unable to answer (P)                      Readmission Assessment (most recent)       Readmission Assessment - 04/01/24 0957          Readmission    Why were you hospitalized in the last 30 days? pt was experiencing exaceberating symptoms     Why were you readmitted? Alarmed about signs/symptoms     When you left the hospital how did you feel? Pt. was medically ready for discharge     When you left the hospital  where did you go? Home with Family     Did patient/caregiver refused recommended DC plan? No     Did you try to manage your symptoms your self? Yes     Did you call anyone? No     Did you try to see or did see a doctor or nurse before you came? No     Did you have  a follow-up appointment on discharge? Yes     Was this a planned readmission? No                    Spoke to patient's sister, Meg Toepte to complete IDPA, due to the patient being mentally delayed. She reported the patient lives at home at alone with 24 hour care. Post hospital  patient  will be able to discharge back home  and Ms. Weaver stated she may be able to provide transportation at d/c, but she has just undergone a knee replacement. There have been a hospitalizations within the last 30 days per pt and chart review. Verified pt PCP and preferred pharmacy. Pt family  stated he is not on Coumadin and is not receiving dialysis. All questions answered regarding case management/ discharge planning , patient's sister verbalized understanding.     Discharge Plan A and Plan B have been determined by review of patient's clinical status, future medical and therapeutic needs, and coverage/benefits for post-acute care in coordination with multidisciplinary team members.

## 2024-04-01 NOTE — ED PROVIDER NOTES
Encounter Date: 3/31/2024       History     Chief Complaint   Patient presents with    Transfer     HPI    Patient is a59yo male with hx of cerebral palsy and HTN presenting as a transfer from OSH for eval of possible SBO. Patient recently discharged from our facility on 3/29 where there was initial concern for ischemic bowel and GI bleed with ileus. He was determined to be more likely ileus and was able to be discharged home, having a bowel movement prior to leaving.    Per OSH documentation, patient's caregiver was concerned that he had not had a bowel movement since 3/29 so had called EMS. Workup at outside facility revealing relatively normal CBC, CMP with mildly elevated transaminases, UTI. KUB concerning for cecal volvulus, CT abd/pelvis with dilatation of stomach and small bowel, possibly 2/2 obstruction sv ileus. Patient was transferred for general surgery evaluation.    Patient not endorsing pain at time of my examination.     Review of patient's allergies indicates:  No Known Allergies  Past Medical History:   Diagnosis Date    Anxiety     Bipolar 1 disorder     BPH (benign prostatic hyperplasia)     Cerebral palsy     Depression     Frequent UTI     HTN (hypertension)     Hypercholesteremia     Mental retardation     Mood disorder due to medical condition     Schizophrenia      Past Surgical History:   Procedure Laterality Date    BOWEL RESECTION      COLONOSCOPY N/A 3/12/2018    Procedure: COLONOSCOPY;  Surgeon: Chip Villanueva MD;  [facility]: [facility];  Service: Endoscopy;  Laterality: N/A;     Family History   Problem Relation Age of Onset    No Known Problems Father     No Known Problems Mother     Heart disease Sister     Breast cancer Sister     Heart disease Sister      Social History     Tobacco Use    Smoking status: Never    Smokeless tobacco: Never   Substance Use Topics    Alcohol use: No    Drug use: No     Physical Exam     Initial Vitals [04/01/24 0207]   BP Pulse Resp Temp SpO2    115/80 100 20 97.9 °F (36.6 °C) 99 %      MAP       --         Physical Exam    Constitutional: He is not diaphoretic. No distress.   Cardiovascular:  Normal rate and regular rhythm.           Pulmonary/Chest: Breath sounds normal. No respiratory distress.   Abdominal: Abdomen is soft. He exhibits distension. There is no abdominal tenderness.   Musculoskeletal:         General: No edema.     Neurological: He is alert.   Skin: Skin is warm and dry.         ED Course   Procedures  Labs Reviewed   HIV 1 / 2 ANTIBODY   HEPATITIS C ANTIBODY   BASIC METABOLIC PANEL   MAGNESIUM   PHOSPHORUS          Imaging Results    None          Medications   acetaminophen tablet 650 mg (has no administration in time range)   lactated ringers infusion ( Intravenous New Bag 4/1/24 8495)   enoxaparin injection 40 mg (has no administration in time range)   prochlorperazine injection Soln 5 mg (has no administration in time range)   ondansetron injection 4 mg (has no administration in time range)   clonazePAM tablet 1 mg (has no administration in time range)   divalproex ER 24 hr tablet 500 mg (has no administration in time range)   EScitalopram oxalate tablet 20 mg (has no administration in time range)   lamoTRIgine tablet 100 mg (has no administration in time range)   risperiDONE tablet 0.5 mg (has no administration in time range)     Medical Decision Making  Risk  Decision regarding hospitalization.    Patient is a59yo male with hx of cerebral palsy and HTN presenting as a transfer from OSH for eval of possible SBO. No BM since 3/29, concern for SBO vs ileus on CT. Patient's abdomen distended but not firm or tender. HDS. NGT in place. General surgery consulted. They have admitted the patient to their service.           Attending Attestation:   Physician Attestation Statement for Resident:  As the supervising MD   Physician Attestation Statement: I have personally seen and examined this patient.   I agree with the above history.  -:    As the supervising MD I agree with the above PE.     As the supervising MD I agree with the above treatment, course, plan, and disposition.     I have reviewed the following: records from a referring facility.                                       Clinical Impression:  Final diagnoses:  [K56.609] Small bowel obstruction          ED Disposition Condition    Admit Stable                Radha Yao,   Resident  04/01/24 0632       Deb Calderon MD  04/01/24 8548

## 2024-04-01 NOTE — ASSESSMENT & PLAN NOTE
Edinson Umanzor is a 59yoM with cerebral palsy, severe mental delay, psychiatric disturbances, HTN, HLD, recent DVT who [place] [facility] [place] concerns related to potential bowel obstruction. He has not had any meaningful bowel function since his recent discharge on 3/30. General surgery consulted for evaluation.     - admit to general surgery  - labs and imaging reviewed   - there is global distension of the stomach, small intestine and colon. There is an area near his previous bowel anastomosis that could represent ongoing small bowel obstruction   - significant fecalization of the sigmoid colon and rectum; unable to be disimpacted on digital rectal exam  - NG in place  - keep to LIWS  - NPO, mIVFs  - lovenox for DVT ppx  - on xarelto at home  - plan for decompression with NG tube; would likely benefit from enemas to reduce stool burden of lower colon  - will discuss with staff

## 2024-04-01 NOTE — NURSING
Pt admitted to the floor at this time. Alert to self. Transferred to bed. Cleaned at this time. Condom cath placed. Assessment completed. 4eyes completed. Vitals stable. No further needs at this time.

## 2024-04-01 NOTE — ED TRIAGE NOTES
Edinson Umanzor, a 59 y.o. male presents to the ED as a transfer from [facility] for a general surgery consult for a small bowel obstruction. NG tube in place upon arrival.    Triage note:  Chief Complaint   Patient presents with    Transfer     Review of patient's allergies indicates:  No Known Allergies  Past Medical History:   Diagnosis Date    Anxiety     Bipolar 1 disorder     BPH (benign prostatic hyperplasia)     Cerebral palsy     Depression     Frequent UTI     HTN (hypertension)     Hypercholesteremia     Mental retardation     Mood disorder due to medical condition     Schizophrenia

## 2024-04-01 NOTE — PLAN OF CARE
04/01/24 0957   Readmission   Why were you hospitalized in the last 30 days? pt was experiencing exaceberating symptoms   Why were you readmitted? Alarmed about signs/symptoms   When you left the hospital how did you feel? Pt. was medically ready for discharge   When you left the hospital where did you go? Home with Family   Did patient/caregiver refused recommended DC plan? No   Did you try to manage your symptoms your self? Yes   Did you call anyone? No   Did you try to see or did see a doctor or nurse before you came? No   Did you have  a follow-up appointment on discharge? Yes   Was this a planned readmission? No

## 2024-04-01 NOTE — SUBJECTIVE & OBJECTIVE
Current Facility-Administered Medications on File Prior to Encounter   Medication    [COMPLETED] 0.9%  NaCl infusion    [COMPLETED] cefTRIAXone (ROCEPHIN) 1 g in dextrose 5 % 100 mL IVPB (ready to mix)    [COMPLETED] clonazePAM tablet 0.5 mg    [COMPLETED] hyoscyamine ODT tablet 0.125 mg    [COMPLETED] metoclopramide injection 10 mg    [COMPLETED] ondansetron injection 8 mg    [COMPLETED] risperiDONE tablet 0.5 mg    [COMPLETED] sodium chloride 0.9% bolus 1,000 mL 1,000 mL    [DISCONTINUED] lamoTRIgine tablet 100 mg    [DISCONTINUED] OLANZapine tablet 10 mg    [DISCONTINUED] traZODone tablet 50 mg     Current Outpatient Medications on File Prior to Encounter   Medication Sig    bisacodyL (DULCOLAX) 10 mg Supp Place 1 suppository (10 mg total) rectally once daily.    clonazePAM (KLONOPIN) 1 MG tablet Take 1 mg by mouth 2 (two) times daily.    divalproex ER (DEPAKOTE) 500 MG Tb24 Take 500 mg by mouth 2 (two) times a day.    docusate sodium (COLACE) 100 MG capsule 2    EScitalopram oxalate (LEXAPRO) 20 MG tablet Take 20 mg by mouth every morning.    ferrous gluconate (FERGON) 324 MG tablet TAKE ONE TABLET BY MOUTH IN THE MORNING    finasteride (PROSCAR) 5 mg tablet TAKE ONE TABLET BY MOUTH IN THE MORNING    folic acid (FOLVITE) 1 MG tablet Take 1 tablet (1,000 mcg total) by mouth once daily.    furosemide (LASIX) 40 MG tablet TAKE ONE TABLET BY MOUTH IN THE MORNING    hydrOXYzine pamoate (VISTARIL) 50 MG Cap Take 50 mg by mouth as needed.    hyoscyamine (ANASPAZ,LEVSIN) 0.125 mg Tab TAKE ONE TABLET BY MOUTH at 8am and 8pm (Patient taking differently: Take 125 mcg by mouth 2 (two) times a day.)    INVEGA SUSTENNA 234 mg/1.5 mL Syrg injection Inject 234 mg into the muscle every 30 days.    lamoTRIgine (LAMICTAL) 100 MG tablet Take 100 mg by mouth 2 (two) times daily.    OLANZapine (ZYPREXA) 10 MG tablet Take 10 mg by mouth nightly.    polyethylene glycol (GLYCOLAX) 17 gram/dose powder Use to cap to measure 17g, mix with  liquid, and take by Per NG tube once daily.    potassium chloride SA (K-DUR,KLOR-CON) 20 MEQ tablet 1 po daily (Patient taking differently: Take 20 mEq by mouth once daily. 1 po daily)    risperiDONE (RISPERDAL) 0.5 MG Tab Take 0.5 mg by mouth every evening.    rivaroxaban (XARELTO) 20 mg Tab Take 1 tablet (20 mg total) by mouth daily with dinner or evening meal.    traZODone (DESYREL) 50 MG tablet Take 50 mg by mouth every evening.    vitamin B complex no.12-niacin 50 mg/15 mL Liqd     vitamin D (VITAMIN D3) 1000 units Tab Take 1,000 Units by mouth once daily.       Review of patient's allergies indicates:  No Known Allergies    Past Medical History:   Diagnosis Date    Anxiety     Bipolar 1 disorder     BPH (benign prostatic hyperplasia)     Cerebral palsy     Depression     Frequent UTI     HTN (hypertension)     Hypercholesteremia     Mental retardation     Mood disorder due to medical condition     Schizophrenia      Past Surgical History:   Procedure Laterality Date    BOWEL RESECTION      COLONOSCOPY N/A 3/12/2018    Procedure: COLONOSCOPY;  Surgeon: Chip Villanueva MD;  Location: [facility];  Service: Endoscopy;  Laterality: N/A;     Family History       Problem Relation (Age of Onset)    Breast cancer Sister    Heart disease Sister, Sister    No Known Problems Father, Mother          Tobacco Use    Smoking status: Never    Smokeless tobacco: Never   Substance and Sexual Activity    Alcohol use: No    Drug use: No    Sexual activity: Never     Review of Systems   Unable to perform ROS: Other     Objective:     Vital Signs (Most Recent):  Temp: 97.9 °F (36.6 °C) (04/01/24 0207)  Pulse: 100 (04/01/24 0207)  Resp: 20 (04/01/24 0207)  BP: 115/80 (04/01/24 0207)  SpO2: 99 % (04/01/24 0207) Vital Signs (24h Range):  Temp:  [97.7 °F (36.5 °C)-97.9 °F (36.6 °C)] 97.9 °F (36.6 °C)  Pulse:  [100-115] 100  Resp:  [20-34] 20  SpO2:  [96 %-99 %] 99 %  BP: (115-123)/(77-86) 115/80     Weight: 61.2 kg (135 lb)  Body  mass index is 21.79 kg/m².     Physical Exam  Vitals and nursing note reviewed.   Constitutional:       General: He is not in acute distress.     Appearance: He is not toxic-appearing.   HENT:      Nose:      Comments: NG tube in place with thin, brown output  Cardiovascular:      Rate and Rhythm: Regular rhythm. Tachycardia present.   Pulmonary:      Effort: Pulmonary effort is normal. No respiratory distress.   Abdominal:      Comments: Abdomen soft, moderately distended  Non-tender throughout abdomen  No signs of peritonitis   Genitourinary:     Comments: Digital rectal exam performed without significant stool burden to be disimpacted  Skin:     General: Skin is warm.   Neurological:      Mental Status: He is alert. Mental status is at baseline.            I have reviewed all pertinent lab results within the past 24 hours.  CBC:   Recent Labs   Lab 03/31/24  1817   WBC 10.10   RBC 4.18*   HGB 13.0*   HCT 40.1      MCV 96   MCH 31.1*   MCHC 32.4     CMP:   Recent Labs   Lab 03/31/24  1817   *   CALCIUM 9.5   ALBUMIN 3.6   PROT 7.9      K 4.9   CO2 29      BUN 16   CREATININE 0.93   ALKPHOS 174*   *   AST 85*   BILITOT 0.4       Significant Diagnostics:  I have reviewed all pertinent imaging results/findings within the past 24 hours.  I reviewed his CT scan from today and compared it to 3/23/24. There is a persistent area in the posterior abdomen near his previous anastomosis that could represent a site of persistent small bowel obstruction.

## 2024-04-02 VITALS
BODY MASS INDEX: 21.69 KG/M2 | DIASTOLIC BLOOD PRESSURE: 69 MMHG | OXYGEN SATURATION: 97 % | HEART RATE: 105 BPM | RESPIRATION RATE: 18 BRPM | WEIGHT: 135 LBS | SYSTOLIC BLOOD PRESSURE: 133 MMHG | HEIGHT: 66 IN | TEMPERATURE: 98 F

## 2024-04-02 PROBLEM — K56.0 ADYNAMIC ILEUS: Status: ACTIVE | Noted: 2024-04-01

## 2024-04-02 LAB
ANION GAP SERPL CALC-SCNC: 7 MMOL/L (ref 8–16)
BASOPHILS # BLD AUTO: 0.04 K/UL (ref 0–0.2)
BASOPHILS NFR BLD: 0.5 % (ref 0–1.9)
BUN SERPL-MCNC: 11 MG/DL (ref 8–23)
CALCIUM SERPL-MCNC: 9 MG/DL (ref 8.7–10.5)
CHLORIDE SERPL-SCNC: 109 MMOL/L (ref 95–110)
CO2 SERPL-SCNC: 24 MMOL/L (ref 23–29)
CREAT SERPL-MCNC: 0.7 MG/DL (ref 0.5–1.4)
DIFFERENTIAL METHOD BLD: ABNORMAL
EOSINOPHIL # BLD AUTO: 0.1 K/UL (ref 0–0.5)
EOSINOPHIL NFR BLD: 1.7 % (ref 0–8)
ERYTHROCYTE [DISTWIDTH] IN BLOOD BY AUTOMATED COUNT: 14.7 % (ref 11.5–14.5)
EST. GFR  (NO RACE VARIABLE): >60 ML/MIN/1.73 M^2
GLUCOSE SERPL-MCNC: 72 MG/DL (ref 70–110)
HCT VFR BLD AUTO: 32.1 % (ref 40–54)
HGB BLD-MCNC: 10.4 G/DL (ref 14–18)
IMM GRANULOCYTES # BLD AUTO: 0.22 K/UL (ref 0–0.04)
IMM GRANULOCYTES NFR BLD AUTO: 2.7 % (ref 0–0.5)
LYMPHOCYTES # BLD AUTO: 2.1 K/UL (ref 1–4.8)
LYMPHOCYTES NFR BLD: 26.4 % (ref 18–48)
MAGNESIUM SERPL-MCNC: 1.7 MG/DL (ref 1.6–2.6)
MCH RBC QN AUTO: 31.5 PG (ref 27–31)
MCHC RBC AUTO-ENTMCNC: 32.4 G/DL (ref 32–36)
MCV RBC AUTO: 97 FL (ref 82–98)
MONOCYTES # BLD AUTO: 0.6 K/UL (ref 0.3–1)
MONOCYTES NFR BLD: 7.7 % (ref 4–15)
NEUTROPHILS # BLD AUTO: 4.9 K/UL (ref 1.8–7.7)
NEUTROPHILS NFR BLD: 61 % (ref 38–73)
NRBC BLD-RTO: 0 /100 WBC
PHOSPHATE SERPL-MCNC: 3.1 MG/DL (ref 2.7–4.5)
PLATELET # BLD AUTO: 214 K/UL (ref 150–450)
PMV BLD AUTO: 10.8 FL (ref 9.2–12.9)
POTASSIUM SERPL-SCNC: 4 MMOL/L (ref 3.5–5.1)
RBC # BLD AUTO: 3.3 M/UL (ref 4.6–6.2)
SODIUM SERPL-SCNC: 140 MMOL/L (ref 136–145)
WBC # BLD AUTO: 8.1 K/UL (ref 3.9–12.7)

## 2024-04-02 PROCEDURE — 25000003 PHARM REV CODE 250

## 2024-04-02 PROCEDURE — 25000003 PHARM REV CODE 250: Performed by: STUDENT IN AN ORGANIZED HEALTH CARE EDUCATION/TRAINING PROGRAM

## 2024-04-02 PROCEDURE — 99232 SBSQ HOSP IP/OBS MODERATE 35: CPT | Mod: ,,, | Performed by: STUDENT IN AN ORGANIZED HEALTH CARE EDUCATION/TRAINING PROGRAM

## 2024-04-02 PROCEDURE — 83735 ASSAY OF MAGNESIUM: CPT | Performed by: STUDENT IN AN ORGANIZED HEALTH CARE EDUCATION/TRAINING PROGRAM

## 2024-04-02 PROCEDURE — 85025 COMPLETE CBC W/AUTO DIFF WBC: CPT | Performed by: STUDENT IN AN ORGANIZED HEALTH CARE EDUCATION/TRAINING PROGRAM

## 2024-04-02 PROCEDURE — 84100 ASSAY OF PHOSPHORUS: CPT | Performed by: STUDENT IN AN ORGANIZED HEALTH CARE EDUCATION/TRAINING PROGRAM

## 2024-04-02 PROCEDURE — 36415 COLL VENOUS BLD VENIPUNCTURE: CPT | Performed by: STUDENT IN AN ORGANIZED HEALTH CARE EDUCATION/TRAINING PROGRAM

## 2024-04-02 PROCEDURE — 80048 BASIC METABOLIC PNL TOTAL CA: CPT | Performed by: STUDENT IN AN ORGANIZED HEALTH CARE EDUCATION/TRAINING PROGRAM

## 2024-04-02 RX ORDER — PSEUDOEPHEDRINE/ACETAMINOPHEN 30MG-500MG
100 TABLET ORAL
Status: DISCONTINUED | OUTPATIENT
Start: 2024-04-02 | End: 2024-04-02

## 2024-04-02 RX ORDER — SYRING-NEEDL,DISP,INSUL,0.3 ML 29 G X1/2"
296 SYRINGE, EMPTY DISPOSABLE MISCELLANEOUS
Status: DISCONTINUED | OUTPATIENT
Start: 2024-04-02 | End: 2024-04-02

## 2024-04-02 RX ORDER — POLYETHYLENE GLYCOL 3350 17 G/17G
17 POWDER, FOR SOLUTION ORAL 2 TIMES DAILY
Qty: 1020 G | Refills: 2 | Status: SHIPPED | OUTPATIENT
Start: 2024-04-02 | End: 2024-07-01

## 2024-04-02 RX ORDER — POLYETHYLENE GLYCOL 3350 17 G/17G
17 POWDER, FOR SOLUTION ORAL 2 TIMES DAILY
Status: DISCONTINUED | OUTPATIENT
Start: 2024-04-02 | End: 2024-04-02 | Stop reason: HOSPADM

## 2024-04-02 RX ORDER — SENNOSIDES 8.6 MG/1
1 TABLET ORAL 2 TIMES DAILY
Qty: 60 TABLET | Refills: 2 | Status: SHIPPED | OUTPATIENT
Start: 2024-04-02 | End: 2024-07-01

## 2024-04-02 RX ADMIN — CLONAZEPAM 1 MG: 0.5 TABLET ORAL at 08:04

## 2024-04-02 RX ADMIN — DIVALPROEX SODIUM 500 MG: 500 TABLET, FILM COATED, EXTENDED RELEASE ORAL at 08:04

## 2024-04-02 RX ADMIN — SENNOSIDES 8.6 MG: 8.6 TABLET, FILM COATED ORAL at 08:04

## 2024-04-02 RX ADMIN — ESCITALOPRAM OXALATE 20 MG: 20 TABLET ORAL at 08:04

## 2024-04-02 RX ADMIN — POLYETHYLENE GLYCOL 3350 17 G: 17 POWDER, FOR SOLUTION ORAL at 08:04

## 2024-04-02 RX ADMIN — Medication 1 ENEMA: at 08:04

## 2024-04-02 RX ADMIN — RIVAROXABAN 20 MG: 20 TABLET, FILM COATED ORAL at 04:04

## 2024-04-02 RX ADMIN — LAMOTRIGINE 100 MG: 100 TABLET ORAL at 08:04

## 2024-04-02 NOTE — HOSPITAL COURSE
Mr. Umanzor was re admitted with chronic ileus. NGT was placed with minimal output. This was removed shortly and his bowel regimen was increased with good result. His diet was advanced and he tolerated this well. On 4/2 he was back to his baseline and medically stable for discharge. His home bowel regimen for his chronic bowel dysmotility was optimized and this new regimen was discussed with family prior to discharge.

## 2024-04-02 NOTE — PROGRESS NOTES
[facility]  General Surgery  Progress Note    Subjective:     History of Present Illness:  Edinson Umanzor is a 59yoM with a history of cerebral palsy, profound mental delay, HTN, HLD, recent DVT (on xarelto) who was diagnosed from [facility] on 3/30/24 following an admission with concern for bowel obstruction versus cecal volvulus. During that admission, he was managed conservatively and had spontaneous return of bowel function. According to the outside hospital emergency room records, his caregiver reports no further bowel function since his discharge, which prompted his presentation.     At the outside facility, the patient's work-up demonstrates normal lab work. His electrolytes are within normal limits. He has a normal lactic acid. CT scan demonstrates distended stomach, small intestine and colon. There is concern for ongoing bowel obstruction at his previous anastomosis, although there is signficant colonic beyond this point. There is dense fecalization of the sigmoid colon and rectum. He was transferred to [facility] for general surgery evaluation.     At the time of my exam, the patient is resting comfortably in bed. He has an NG tube in place with minimal brown, thin output. His abdomen is distended but remains soft and is non-tender. He is able to answer yes or no questions for me, and he denies any abdominal pain.     Post-Op Info:  * No surgery found *         Interval History: NAOEN. Afebrile. HDS. NGT removed yesterday, no n/v. BM x2 yesterday after enema.     Medications:  Continuous Infusions:  Scheduled Meds:   clonazePAM  1 mg Oral BID    divalproex ER  500 mg Oral BID    enoxparin  40 mg Subcutaneous Daily    EScitalopram oxalate  20 mg Oral QAM    glycerin 99.5%  100 mL Rectal ED 1 Time    And    magnesium citrate  296 mL Rectal ED 1 Time    And    sodium chloride 0.9%  500 mL Rectal ED 1 Time    lamoTRIgine  100 mg Oral BID    polyethylene glycol  17 g Oral Daily    risperiDONE  0.5 mg Oral QHS    senna   8.6 mg Oral BID    sodium phosphates  1 enema Rectal Daily     PRN Meds:acetaminophen, ondansetron, prochlorperazine     Review of patient's allergies indicates:  No Known Allergies  Objective:     Vital Signs (Most Recent):  Temp: 98.1 °F (36.7 °C) (04/02/24 0000)  Pulse: 96 (04/02/24 0000)  Resp: 16 (04/02/24 0000)  BP: 128/76 (04/02/24 0000)  SpO2: 96 % (04/02/24 0000) Vital Signs (24h Range):  Temp:  [97.2 °F (36.2 °C)-98.1 °F (36.7 °C)] 98.1 °F (36.7 °C)  Pulse:  [] 96  Resp:  [16-20] 16  SpO2:  [94 %-98 %] 96 %  BP: (101-132)/(58-84) 128/76     Weight: 61.2 kg (135 lb)  Body mass index is 21.79 kg/m².    Intake/Output - Last 3 Shifts         03/31 0700  04/01 0659 04/01 0700  04/02 0659 04/02 0700  04/03 0659    I.V. (mL/kg)  1534.8 (25.1)     IV Piggyback 600      Total Intake(mL/kg) 600 (9.8) 1534.8 (25.1)     Urine (mL/kg/hr)  0 (0)     Emesis/NG output 350      Drains  300     Stool  0     Total Output 350 300     Net +250 +1234.8            Urine Occurrence  2 x     Stool Occurrence  2 x              Physical Exam  Vitals and nursing note reviewed.   Constitutional:       General: He is not in acute distress.     Appearance: He is not ill-appearing or diaphoretic.      Comments: Room air   HENT:      Head: Normocephalic and atraumatic.      Mouth/Throat:      Mouth: Mucous membranes are moist.      Pharynx: Oropharynx is clear.   Eyes:      Extraocular Movements: Extraocular movements intact.      Conjunctiva/sclera: Conjunctivae normal.   Cardiovascular:      Rate and Rhythm: Normal rate.   Pulmonary:      Effort: Pulmonary effort is normal. No respiratory distress.   Abdominal:      General: There is no distension.      Palpations: Abdomen is soft.      Tenderness: There is no abdominal tenderness. There is no guarding or rebound.      Comments: Abdomen soft, mildly distended improved from yesterday. Non-tender throughout abdomen. No peritonitic signs      Skin:     General: Skin is warm and dry.       Coloration: Skin is not jaundiced.   Neurological:      Mental Status: He is alert. Mental status is at baseline.          Significant Labs:  I have reviewed all pertinent lab results within the past 24 hours.    Significant Diagnostics:  I have reviewed all pertinent imaging results/findings within the past 24 hours.  Assessment/Plan:     * Bowel obstruction  Edinson Umanzor is a 59yoM with cerebral palsy, severe mental delay, psychiatric disturbances, HTN, HLD, recent DVT who returns to [facility] with ongoing concerns related to potential bowel obstruction. He has not had any meaningful bowel function since his recent discharge on 3/30. NGT placed with minimal output, since removed with no further n/v and had ROBF with bowel regimen and enema. Clinically stable    - Nectar thick liquids (per SLP last admission)  - Aspiration precautions  - Enema this AM  - Bowel regimen (miralax and senna BID)  - PRN pain and nausea medications  - Daily labs  - Replete electrolytes PRN  - DVT prophylaxis (SCDs and home xarelto)  - Home meds (klonopin, lexapro, Depakote, lamictal, xarelto, Risperdal)  - OOB, ambulate    Dispo: approaching medical stability for dc. Has 24/7 sitters at home. Needs aggressive bowel regimen at dc       Case discussed with Dr. Zhu.       GIANCARLO MathewsC  General Surgery  [facility]

## 2024-04-02 NOTE — ASSESSMENT & PLAN NOTE
Edinson Umanzor is a 59yoM with cerebral palsy, severe mental delay, psychiatric disturbances, HTN, HLD, recent DVT who returns to [facility] with ongoing concerns related to potential bowel obstruction. He has not had any meaningful bowel function since his recent discharge on 3/30. NGT placed with minimal output, since removed with no further n/v and had ROBF with bowel regimen and enema. Clinically stable    - Nectar thick liquids (per SLP last admission)  - Aspiration precautions  - Enema this AM  - Bowel regimen (miralax and senna BID)  - PRN pain and nausea medications  - Daily labs  - Replete electrolytes PRN  - DVT prophylaxis (SCDs and home xarelto)  - Home meds (klonopin, lexapro, Depakote, lamictal, xarelto, Risperdal)  - OOB, ambulate    Dispo: approaching medical stability for dc. Has 24/7 sitters at home. Needs aggressive bowel regimen at dc

## 2024-04-02 NOTE — NURSING
[facility] Plan of Care Note        Dx  Bowel Obstruction    Shift Events: Clear liquid diet started, pt tolerated nectar thick liquids well, intermittent restlessness and confusion-video monitor placed at bedside    Goals of Care: Neuro: ANOx2     Respiratory:  Room Air  Diet:  Clear liquids, Nectar thick  Is patient tolerating current diet? YES     GTTS:  N/A  Urine Output/Bowel Movement:    Drains/Tubes/Tube Feeds (include total output/shift):  None  Lines:  R Arm PIV     Accuchecks:    Skin:  R Arm and R Knee abrasions present on admit  Fall Risk Score: 17     Activity level? Completely dependent     Any scheduled procedures? No     Any safety concerns? Fall risk; Aspiration, Seizure     Other:

## 2024-04-02 NOTE — PLAN OF CARE
Problem: Adult Inpatient Plan of Care  Goal: Plan of Care Review  Outcome: Ongoing, Progressing  Flowsheets (Taken 4/2/2024 8412)  Plan of Care Reviewed With: patient  Goal: Patient-Specific Goal (Individualized)  Outcome: Ongoing, Progressing  Goal: Absence of Hospital-Acquired Illness or Injury  Outcome: Ongoing, Progressing  Goal: Optimal Comfort and Wellbeing  Outcome: Ongoing, Progressing  Goal: Readiness for Transition of Care  Outcome: Ongoing, Progressing     Problem: Impaired Wound Healing  Goal: Optimal Wound Healing  Outcome: Ongoing, Progressing

## 2024-04-02 NOTE — ASSESSMENT & PLAN NOTE
Edinson Umanzor is a 59yoM with cerebral palsy, severe mental delay, psychiatric disturbances, HTN, HLD, recent DVT who returns to [facility] with ongoing concerns related to potential bowel obstruction. He has not had any meaningful bowel function since his recent discharge on 3/30. NGT placed with minimal output, since removed with no further n/v and had ROBF with bowel regimen and enema. Clinically stable    - Nectar thick liquids (per SLP last admission)  - Aspiration precautions  - Enema this AM  - Bowel regimen (miralax and senna BID)  - PRN pain and nausea medications  - Daily labs  - Replete electrolytes PRN  - DVT prophylaxis (SCDs and home xarelto)  - Home meds (klonopin, lexapro, Depakote, lamictal, xarelto, Risperdal)  - OOB, ambulate    Dispo: approaching medical stability for dc. Has 24/7 sitters at home. Needs aggressive bowel regimen at dc

## 2024-04-02 NOTE — SUBJECTIVE & OBJECTIVE
Interval History: NAOEN. Afebrile. HDS. NGT removed yesterday, no n/v. BM x2 yesterday after enema.     Medications:  Continuous Infusions:  Scheduled Meds:   clonazePAM  1 mg Oral BID    divalproex ER  500 mg Oral BID    enoxparin  40 mg Subcutaneous Daily    EScitalopram oxalate  20 mg Oral QAM    glycerin 99.5%  100 mL Rectal ED 1 Time    And    magnesium citrate  296 mL Rectal ED 1 Time    And    sodium chloride 0.9%  500 mL Rectal ED 1 Time    lamoTRIgine  100 mg Oral BID    polyethylene glycol  17 g Oral Daily    risperiDONE  0.5 mg Oral QHS    senna  8.6 mg Oral BID    sodium phosphates  1 enema Rectal Daily     PRN Meds:acetaminophen, ondansetron, prochlorperazine     Review of patient's allergies indicates:  No Known Allergies  Objective:     Vital Signs (Most Recent):  Temp: 98.1 °F (36.7 °C) (04/02/24 0000)  Pulse: 96 (04/02/24 0000)  Resp: 16 (04/02/24 0000)  BP: 128/76 (04/02/24 0000)  SpO2: 96 % (04/02/24 0000) Vital Signs (24h Range):  Temp:  [97.2 °F (36.2 °C)-98.1 °F (36.7 °C)] 98.1 °F (36.7 °C)  Pulse:  [] 96  Resp:  [16-20] 16  SpO2:  [94 %-98 %] 96 %  BP: (101-132)/(58-84) 128/76     Weight: 61.2 kg (135 lb)  Body mass index is 21.79 kg/m².    Intake/Output - Last 3 Shifts         03/31 0700  04/01 0659 04/01 0700  04/02 0659 04/02 0700  04/03 0659    I.V. (mL/kg)  1534.8 (25.1)     IV Piggyback 600      Total Intake(mL/kg) 600 (9.8) 1534.8 (25.1)     Urine (mL/kg/hr)  0 (0)     Emesis/NG output 350      Drains  300     Stool  0     Total Output 350 300     Net +250 +1234.8            Urine Occurrence  2 x     Stool Occurrence  2 x              Physical Exam  Vitals and nursing note reviewed.   Constitutional:       General: He is not in acute distress.     Appearance: He is not ill-appearing or diaphoretic.      Comments: Room air   HENT:      Head: Normocephalic and atraumatic.      Mouth/Throat:      Mouth: Mucous membranes are moist.      Pharynx: Oropharynx is clear.   Eyes:       Extraocular Movements: Extraocular movements intact.      Conjunctiva/sclera: Conjunctivae normal.   Cardiovascular:      Rate and Rhythm: Normal rate.   Pulmonary:      Effort: Pulmonary effort is normal. No respiratory distress.   Abdominal:      General: There is no distension.      Palpations: Abdomen is soft.      Tenderness: There is no abdominal tenderness. There is no guarding or rebound.      Comments: Abdomen soft, mildly distended improved from yesterday. Non-tender throughout abdomen. No peritonitic signs      Skin:     General: Skin is warm and dry.      Coloration: Skin is not jaundiced.   Neurological:      Mental Status: He is alert. Mental status is at baseline.          Significant Labs:  I have reviewed all pertinent lab results within the past 24 hours.    Significant Diagnostics:  I have reviewed all pertinent imaging results/findings within the past 24 hours.

## 2024-04-02 NOTE — DISCHARGE SUMMARY
[facility]  General Surgery  Discharge Summary      Patient Name: Edinson Umanzor  MRN: [id number]  Admission Date: 4/1/2024  Hospital Length of Stay: 1 days  Discharge Date and Time:  04/02/2024 2:55 PM  Attending Physician: Miguel Zhu MD   Discharging Provider: Remy Alejandre MD  Primary Care Provider: Krishan Massey II, NP    HPI:   Edinson Umanzor is a 59yoM with a history of cerebral palsy, profound mental delay, HTN, HLD, recent DVT (on xarelto) who was diagnosed from [facility] on 3/30/24 following an admission with concern for bowel obstruction versus cecal volvulus. During that admission, he was managed conservatively and had spontaneous return of bowel function. According to the outside hospital emergency room records, his caregiver reports no further bowel function since his discharge, which prompted his presentation.     At the outside facility, the patient's work-up demonstrates normal lab work. His electrolytes are within normal limits. He has a normal lactic acid. CT scan demonstrates distended stomach, small intestine and colon. There is concern for ongoing bowel obstruction at his previous anastomosis, although there is signficant colonic beyond this point. There is dense fecalization of the sigmoid colon and rectum. He was transferred to [facility] for general surgery evaluation.     At the time of my exam, the patient is resting comfortably in bed. He has an NG tube in place with minimal brown, thin output. His abdomen is distended but remains soft and is non-tender. He is able to answer yes or no questions for me, and he denies any abdominal pain.     * No surgery found *      Indwelling Lines/Drains at time of discharge:   Lines/Drains/Airways       Drain  Duration             Male External Urinary Catheter 04/01/24 0545 Small 1 day                  Hospital Course: Mr. Umanzor was re admitted with chronic ileus. NGT was placed with minimal output. This was removed shortly and his  bowel regimen was increased with good result. His diet was advanced and he tolerated this well. On 4/2 he was back to his baseline and medically stable for discharge. His home bowel regimen for his chronic bowel dysmotility was optimized and this new regimen was discussed with family prior to discharge.     Goals of Care Treatment Preferences:  Code Status: Full Code      Consults:   Consults (From admission, onward)          Status Ordering Provider     Inpatient consult to General surgery  Once        Provider:  (Not yet assigned)    JAZ Crum            Significant Diagnostic Studies: N/A    Pending Diagnostic Studies:       None          Final Active Diagnoses:    Diagnosis Date Noted POA    PRINCIPAL PROBLEM:  Adynamic ileus [K56.0] 04/01/2024 Yes    Acute deep vein thrombosis (DVT) of left lower extremity [I82.402] 03/03/2024 Yes    Cerebral palsy [G80.9] 05/25/2020 Yes    Mixed dyslipidemia [E78.2] 07/06/2017 Yes    Mental retardation [F79] 07/06/2017 Yes      Problems Resolved During this Admission:      Discharged Condition: good    Disposition: Home or Self Care    Follow Up:    Patient Instructions:      Ambulatory referral/consult to [facility]   Standing Status: Future   Referral Priority: Routine Referral Type: Consultation   Referral Reason: Specialty Services Required   Number of Visits Requested: 1     Ambulatory referral/consult to [facility]   Standing Status: Future   Referral Priority: Routine Referral Type: Consultation   Referral Reason: Specialty Services Required   Number of Visits Requested: 1     Notify your health care provider if you experience any of the following:  temperature >100.4     Notify your health care provider if you experience any of the following:  persistent nausea and vomiting or diarrhea     Notify your health care provider if you experience any of the following:  severe uncontrolled pain     Activity as tolerated      Medications:  Reconciled Home Medications:      Medication List        START taking these medications      senna 8.6 mg tablet  Commonly known as: SENOKOT  Take 1 tablet by mouth 2 (two) times a day.            CHANGE how you take these medications      * GAVILAX 17 gram/dose powder  Generic drug: polyethylene glycol  Use to cap to measure 17g, mix with liquid, and take by Per NG tube once daily.  What changed: Another medication with the same name was added. Make sure you understand how and when to take each.     * polyethylene glycol 17 gram Pwpk  Commonly known as: GLYCOLAX  Take 17 g by mouth 2 (two) times daily.  What changed: You were already taking a medication with the same name, and this prescription was added. Make sure you understand how and when to take each.     potassium chloride SA 20 MEQ tablet  Commonly known as: K-DUR,KLOR-CON  1 po daily  What changed:   how much to take  how to take this  when to take this           * This list has 2 medication(s) that are the same as other medications prescribed for you. Read the directions carefully, and ask your doctor or other care provider to review them with you.                CONTINUE taking these medications      clonazePAM 1 MG tablet  Commonly known as: KlonoPIN  Take 1 mg by mouth 2 (two) times daily.     divalproex  MG Tb24  Commonly known as: DEPAKOTE ER  Take 500 mg by mouth 2 (two) times a day.     docusate sodium 100 MG capsule  Commonly known as: COLACE  2     EScitalopram oxalate 20 MG tablet  Commonly known as: LEXAPRO  Take 20 mg by mouth every morning.     ferrous gluconate 324 MG tablet  Commonly known as: FERGON  TAKE ONE TABLET BY MOUTH IN THE MORNING     finasteride 5 mg tablet  Commonly known as: PROSCAR  TAKE ONE TABLET BY MOUTH IN THE MORNING     folic acid 1 MG tablet  Commonly known as: FOLVITE  Take 1 tablet (1,000 mcg total) by mouth once daily.     furosemide 40 MG tablet  Commonly known as: LASIX  TAKE ONE TABLET BY  MOUTH IN THE MORNING     hydrOXYzine pamoate 50 MG Cap  Commonly known as: VISTARIL  Take 50 mg by mouth as needed.     INVEGA SUSTENNA 234 mg/1.5 mL Syrg injection  Generic drug: paliperidone palmitate  Inject 234 mg into the muscle every 30 days.     lamoTRIgine 100 MG tablet  Commonly known as: LAMICTAL  Take 100 mg by mouth 2 (two) times daily.     OLANZapine 10 MG tablet  Commonly known as: ZyPREXA  Take 10 mg by mouth nightly.     risperiDONE 0.5 MG Tab  Commonly known as: RISPERDAL  Take 0.5 mg by mouth every evening.     rivaroxaban 20 mg Tab  Commonly known as: XARELTO  Take 1 tablet (20 mg total) by mouth daily with dinner or evening meal.     traZODone 50 MG tablet  Commonly known as: DESYREL  Take 50 mg by mouth every evening.     vitamin B complex no.12-niacin 50 mg/15 mL Liqd     vitamin D 1000 units Tab  Commonly known as: VITAMIN D3  Take 1,000 Units by mouth once daily.            STOP taking these medications      hyoscyamine 0.125 mg Tab  Commonly known as: ANASPAZ,LEVSIN            Time spent on the discharge of patient: 10 minutes    Remy Alejandre MD  General Surgery  [facility]

## 2024-04-02 NOTE — PLAN OF CARE
04/02/24 1508   Final Note   Assessment Type Final Discharge Note   Anticipated Discharge Disposition Home   What phone number can be called within the next 1-3 days to see how you are doing after discharge? [phone]  (Simultaneous filing. User may not have seen previous data.)   Hospital Resources/Appts/Education Provided Provided patient/caregiver with written discharge plan information;Provided education on problems/symptoms using teachback;Appointments scheduled and added to AVS       Patient will discharge home, where he has 24 hour care. Spoke with patient's brother Clemente James via phone [phone] and he will providing transporting the patient from the hospital.

## 2024-04-11 PROBLEM — D68.59 HYPERCOAGULABLE STATE: Status: ACTIVE | Noted: 2024-04-11

## 2024-04-11 PROBLEM — Z91.89 AT HIGH RISK FOR BLEEDING: Status: ACTIVE | Noted: 2024-04-11

## 2024-04-14 NOTE — DISCHARGE SUMMARY
[facility]  General Surgery  Discharge Summary      Patient Name: Edinson Umanzor  MRN: [id number]  Admission Date: 3/24/2024  Hospital Length of Stay: 5 days  Discharge Date and Time:  04/14/2024 8:48 AM  Attending Physician: No att. providers found   Discharging Provider: Shelbi Peres MD  Primary Care Provider: Krishan Massey II, NP     HPI: 1M with PMHx of anxiety/depression, bipolar 1, schizophrenia, cerebral palsy, profound mental delay, BPH, HTN, & HLD with recently diagnosed DVT (LLE, 3/24) on Xarelto presented to [place] ED 3/22 c/o spotty bloody sputum as well as reported blood in stool. H&H stable. He was admitted to the hospital medicine service at [facility] for further management.     On the morning of 3/23, he was markedly more distended on exam. Notably lack of clear communication with patient makes history and diagnosis difficult. A CT A/P was obtained that demonstrated severe distention of stomach, small bowel, colon, and distal esophagus suggesting profound ileus with possible pneumatosis of stomach and small bowel with concern for early ischemic process.  IV abx and fluid resuscitation were started, and an NGT placed for decompression return of dark foul-smelling drainage.  The patient became increasingly more tachycardic w/ uptrending lactate 1.5->2.2->3.0. He is transferred to [facility] for evaluation by General Surgery and higher level of care.       * No surgery found *     Hospital Course: Patient was admitted to the SICU for evaluation of concern of ischemic bowel and a possible GI bleed. While in the ICU on hospital day 0, he was resuscitated with mIVF, lactate trended. Return of bowel function. NGT decompression. Home xarelto was held for risk of a ongoing bleeding. Zosyn was started for leukocytosis, stopped on hospital day 3.. Return of bowel function on hospital day one. NGT removed on 3/27 and speech consulted. Patient on 3/30 was tolerating a diet, home  "anticoagulation restarted. He was meeting all milestones for discharge. He will follow-up with his PCP.     Consults (From admission, onward)          Status Ordering Provider     Inpatient consult to Midline team  Once        Provider:  (Not yet assigned)    Completed JULIA MOORE     Inpatient consult to Midline team  Once        Provider:  (Not yet assigned)    Completed SY ALLRED     Inpatient consult to Midline team  Once        Provider:  (Not yet assigned)    Completed JULIA MOORE     Inpatient consult to Midline team  Once        Provider:  (Not yet assigned)    Completed JULIA MOORE            Significant Diagnostic Studies: Labs: BMP: No results for input(s): "GLU", "NA", "K", "CL", "CO2", "BUN", "CREATININE", "CALCIUM", "MG" in the last 48 hours., CMP No results for input(s): "NA", "K", "CL", "CO2", "GLU", "BUN", "CREATININE", "CALCIUM", "PROT", "ALBUMIN", "BILITOT", "ALKPHOS", "AST", "ALT", "ANIONGAP", "ESTGFRAFRICA", "EGFRNONAA" in the last 48 hours., and CBC No results for input(s): "WBC", "HGB", "HCT", "PLT" in the last 48 hours.    Pending Diagnostic Studies:       None          Final Active Diagnoses:    Diagnosis Date Noted POA    PRINCIPAL PROBLEM:  Ileus [K56.7] 03/24/2024 Yes    Cerebral palsy [G80.9] 05/25/2020 Yes    Mental retardation [F79] 07/06/2017 Yes      Problems Resolved During this Admission:      Discharged Condition: good    Disposition: Home or Self Care    Follow Up: PCP     Patient Instructions:      Diet general     Call MD for:  temperature >100.4     Call MD for:  persistent nausea and vomiting     Call MD for:  severe uncontrolled pain     Call MD for:  redness, tenderness, or signs of infection (pain, swelling, redness, odor or green/yellow discharge around incision site)     No dressing needed     Activity as tolerated     Medications:  Reconciled Home Medications:      Medication List        START taking these medications      GAVILAX 17 " gram/dose powder  Generic drug: polyethylene glycol  Use to cap to measure 17g, mix with liquid, and take by Per NG tube once daily.            CHANGE how you take these medications      ferrous gluconate 324 MG tablet  Commonly known as: FERGON  TAKE ONE TABLET BY MOUTH IN THE MORNING     potassium chloride SA 20 MEQ tablet  Commonly known as: K-DUR,KLOR-CON  1 po daily  What changed:   how much to take  how to take this  when to take this     rivaroxaban 20 mg Tab  Commonly known as: XARELTO  Take 1 tablet (20 mg total) by mouth daily with dinner or evening meal.  What changed: Another medication with the same name was removed. Continue taking this medication, and follow the directions you see here.            CONTINUE taking these medications      clonazePAM 1 MG tablet  Commonly known as: KlonoPIN  Take 1 mg by mouth 2 (two) times daily.     divalproex  MG Tb24  Commonly known as: DEPAKOTE ER  Take 500 mg by mouth 2 (two) times a day.     docusate sodium 100 MG capsule  Commonly known as: COLACE  2     EScitalopram oxalate 20 MG tablet  Commonly known as: LEXAPRO  Take 20 mg by mouth every morning.     finasteride 5 mg tablet  Commonly known as: PROSCAR  TAKE ONE TABLET BY MOUTH IN THE MORNING     folic acid 1 MG tablet  Commonly known as: FOLVITE  Take 1 tablet (1,000 mcg total) by mouth once daily.     furosemide 40 MG tablet  Commonly known as: LASIX  TAKE ONE TABLET BY MOUTH IN THE MORNING     hydrOXYzine pamoate 50 MG Cap  Commonly known as: VISTARIL  Take 50 mg by mouth as needed.     INVEGA SUSTENNA 234 mg/1.5 mL Syrg injection  Generic drug: paliperidone palmitate  Inject 234 mg into the muscle every 30 days.     lamoTRIgine 100 MG tablet  Commonly known as: LAMICTAL  Take 100 mg by mouth 2 (two) times daily.     OLANZapine 10 MG tablet  Commonly known as: ZyPREXA  Take 10 mg by mouth nightly.     risperiDONE 0.5 MG Tab  Commonly known as: RISPERDAL  Take 0.5 mg by mouth every evening.      traZODone 50 MG tablet  Commonly known as: DESYREL  Take 50 mg by mouth every evening.     vitamin B complex no.12-niacin 50 mg/15 mL Liqd     vitamin D 1000 units Tab  Commonly known as: VITAMIN D3  Take 1,000 Units by mouth once daily.              Shelbi Peres MD  General Surgery  [facility]

## 2024-06-24 PROBLEM — K92.0 HEMATEMESIS: Status: RESOLVED | Noted: 2024-03-22 | Resolved: 2024-06-24

## 2025-04-03 PROBLEM — R45.1 AGITATION: Status: ACTIVE | Noted: 2025-04-03

## 2025-04-09 PROBLEM — F39 MOOD DISORDER: Status: ACTIVE | Noted: 2025-04-09

## 2025-05-13 PROBLEM — G93.40 ACUTE ENCEPHALOPATHY: Status: ACTIVE | Noted: 2025-05-13

## 2025-08-28 PROCEDURE — 87086 URINE CULTURE/COLONY COUNT: CPT | Performed by: NURSE PRACTITIONER
